# Patient Record
Sex: FEMALE | Race: WHITE | NOT HISPANIC OR LATINO | Employment: OTHER | URBAN - METROPOLITAN AREA
[De-identification: names, ages, dates, MRNs, and addresses within clinical notes are randomized per-mention and may not be internally consistent; named-entity substitution may affect disease eponyms.]

---

## 2022-03-15 ENCOUNTER — APPOINTMENT (OUTPATIENT)
Dept: LAB | Facility: CLINIC | Age: 77
End: 2022-03-15
Payer: MEDICARE

## 2022-03-15 DIAGNOSIS — E11.29 TYPE 2 DIABETES MELLITUS WITH OTHER KIDNEY COMPLICATION, UNSPECIFIED WHETHER LONG TERM INSULIN USE (HCC): ICD-10-CM

## 2022-03-15 DIAGNOSIS — R80.9 PROTEINURIA, UNSPECIFIED TYPE: ICD-10-CM

## 2022-03-15 LAB
ALBUMIN SERPL BCP-MCNC: 3.9 G/DL (ref 3.5–5)
ALP SERPL-CCNC: 44 U/L (ref 46–116)
ALT SERPL W P-5'-P-CCNC: 57 U/L (ref 12–78)
ANION GAP SERPL CALCULATED.3IONS-SCNC: 3 MMOL/L (ref 4–13)
AST SERPL W P-5'-P-CCNC: 32 U/L (ref 5–45)
BILIRUB SERPL-MCNC: 0.37 MG/DL (ref 0.2–1)
BUN SERPL-MCNC: 19 MG/DL (ref 5–25)
CALCIUM SERPL-MCNC: 9.6 MG/DL (ref 8.3–10.1)
CHLORIDE SERPL-SCNC: 107 MMOL/L (ref 100–108)
CHOLEST SERPL-MCNC: 137 MG/DL
CO2 SERPL-SCNC: 31 MMOL/L (ref 21–32)
CREAT SERPL-MCNC: 0.89 MG/DL (ref 0.6–1.3)
ERYTHROCYTE [DISTWIDTH] IN BLOOD BY AUTOMATED COUNT: 13.9 % (ref 11.6–15.1)
EST. AVERAGE GLUCOSE BLD GHB EST-MCNC: 143 MG/DL
GFR SERPL CREATININE-BSD FRML MDRD: 64 ML/MIN/1.73SQ M
GLUCOSE P FAST SERPL-MCNC: 88 MG/DL (ref 65–99)
HBA1C MFR BLD: 6.6 %
HCT VFR BLD AUTO: 42.5 % (ref 34.8–46.1)
HDLC SERPL-MCNC: 45 MG/DL
HGB BLD-MCNC: 13.7 G/DL (ref 11.5–15.4)
LDLC SERPL CALC-MCNC: 68 MG/DL (ref 0–100)
MCH RBC QN AUTO: 30.4 PG (ref 26.8–34.3)
MCHC RBC AUTO-ENTMCNC: 32.2 G/DL (ref 31.4–37.4)
MCV RBC AUTO: 94 FL (ref 82–98)
NONHDLC SERPL-MCNC: 92 MG/DL
PLATELET # BLD AUTO: 222 THOUSANDS/UL (ref 149–390)
PMV BLD AUTO: 11 FL (ref 8.9–12.7)
POTASSIUM SERPL-SCNC: 4.3 MMOL/L (ref 3.5–5.3)
PROT SERPL-MCNC: 7.2 G/DL (ref 6.4–8.2)
RBC # BLD AUTO: 4.51 MILLION/UL (ref 3.81–5.12)
SODIUM SERPL-SCNC: 141 MMOL/L (ref 136–145)
TRIGL SERPL-MCNC: 119 MG/DL
WBC # BLD AUTO: 9.57 THOUSAND/UL (ref 4.31–10.16)

## 2022-03-15 PROCEDURE — 80053 COMPREHEN METABOLIC PANEL: CPT

## 2022-03-15 PROCEDURE — 36415 COLL VENOUS BLD VENIPUNCTURE: CPT

## 2022-03-15 PROCEDURE — 80061 LIPID PANEL: CPT

## 2022-03-15 PROCEDURE — 85027 COMPLETE CBC AUTOMATED: CPT

## 2022-03-15 PROCEDURE — 83036 HEMOGLOBIN GLYCOSYLATED A1C: CPT

## 2022-04-20 ENCOUNTER — OFFICE VISIT (OUTPATIENT)
Dept: OBGYN CLINIC | Facility: CLINIC | Age: 77
End: 2022-04-20
Payer: MEDICARE

## 2022-04-20 VITALS
SYSTOLIC BLOOD PRESSURE: 110 MMHG | BODY MASS INDEX: 26.29 KG/M2 | WEIGHT: 154 LBS | DIASTOLIC BLOOD PRESSURE: 80 MMHG | HEIGHT: 64 IN

## 2022-04-20 DIAGNOSIS — Z12.31 VISIT FOR SCREENING MAMMOGRAM: ICD-10-CM

## 2022-04-20 DIAGNOSIS — N81.10 VAGINAL PROLAPSE: ICD-10-CM

## 2022-04-20 DIAGNOSIS — Z01.419 ENCOUNTER FOR ANNUAL ROUTINE GYNECOLOGICAL EXAMINATION: Primary | ICD-10-CM

## 2022-04-20 PROCEDURE — G0101 CA SCREEN;PELVIC/BREAST EXAM: HCPCS | Performed by: STUDENT IN AN ORGANIZED HEALTH CARE EDUCATION/TRAINING PROGRAM

## 2022-04-20 RX ORDER — CIPROFLOXACIN 250 MG/1
TABLET, FILM COATED ORAL
COMMUNITY
Start: 2022-03-18

## 2022-04-20 RX ORDER — A/SINGAPORE/GP1908/2015 IVR-180 (AN A/MICHIGAN/45/2015 (H1N1)PDM09-LIKE VIRUS, A/HONG KONG/4801/2014, NYMC X-263B (H3N2) (AN A/HONG KONG/4801/2014-LIKE VIRUS), AND B/BRISBANE/60/2008, WILD TYPE (A B/BRISBANE/60/2008-LIKE VIRUS) 15; 15; 15 UG/.5ML; UG/.5ML; UG/.5ML
INJECTION, SUSPENSION INTRAMUSCULAR
COMMUNITY

## 2022-04-20 RX ORDER — FLUOCINOLONE ACETONIDE 0.1 MG/ML
SOLUTION TOPICAL
COMMUNITY

## 2022-04-20 NOTE — PROGRESS NOTES
Assessment/Plan:    79 yo F -      Problem List Items Addressed This Visit    Visit Diagnoses     Encounter for annual routine gynecological examination    -  Primary  Pap not indicated  Will send records    Visit for screening mammogram        Relevant Orders    Mammo screening bilateral w 3d & cad    Vaginal prolapse  Continue pessary for now - f/u in 3 months  Considering surgery - will see urogyn  Relevant Orders    Ambulatory referral to Urogynecology            Subjective:      Patient ID: Mary Navarro is a 78 y o  female  This is a 78 y o  postmenopausal G0 who presents for annual exam  She uses a pessary for prolapse  She reports it was last exchanged 1 year ago by her GYN in the [place]  It significantly helped her prolapse symptoms  She recently had copious foul smelling discharge  She was treated with flagyl for suspected BV and it improved  She denies vaginal bleeding or pelvic pain  She does endorse both stress and urge incontinence  She is interested in surgical treatment of both prolapse and incontinence  Last pap smear: per pt no h/o abnormal  Last mammogram: per pt Oct 2021 normal  Colon Cancer screening: per pt up to date  DEXA: was scheduled for tomorrow but machine is broken - will reschedule      Family history:  Breast cancer: maternal aunt     had MI and open heart surgery during covid pandemic  The following portions of the patient's history were reviewed and updated as appropriate: allergies, current medications, past family history, past medical history, past social history, past surgical history and problem list     Review of Systems   Constitutional: Negative  HENT: Negative  Eyes: Negative  Respiratory: Negative  Cardiovascular: Negative  Gastrointestinal: Negative  Endocrine: Negative  Genitourinary: Negative for dyspareunia, dysuria, frequency, menstrual problem, pelvic pain, vaginal discharge and vaginal pain  Musculoskeletal: Negative  Skin: Negative  Allergic/Immunologic: Negative  Neurological: Negative  Hematological: Negative  Psychiatric/Behavioral: Negative  Objective:      /80   Ht 5' 4" (1 626 m)   Wt 69 9 kg (154 lb)   LMP  (Exact Date)   BMI 26 43 kg/m²          Physical Exam  Vitals reviewed  Exam conducted with a chaperone present  Cardiovascular:      Rate and Rhythm: Normal rate  Pulmonary:      Effort: Pulmonary effort is normal    Chest:   Breasts: Breasts are symmetrical       Right: No mass, nipple discharge, skin change or tenderness  Left: No mass, nipple discharge, skin change or tenderness  Abdominal:      Palpations: Abdomen is soft  Genitourinary:     Labia:         Right: No rash  Left: No rash  Vagina: Normal  No signs of injury  Cervix: No cervical motion tenderness, discharge, friability or lesion  Uterus: Not enlarged and not fixed  Adnexa:         Right: No mass, tenderness or fullness  Left: No mass, tenderness or fullness  Comments: Pessary removed and replaced  No granulation tissue  +cystocele and uterine prolapse  +atrophy  Musculoskeletal:      Cervical back: Normal range of motion  Skin:     General: Skin is warm and dry  Neurological:      Mental Status: She is alert and oriented to person, place, and time     Psychiatric:         Behavior: Behavior normal

## 2022-04-22 ENCOUNTER — IMMUNIZATIONS (OUTPATIENT)
Dept: FAMILY MEDICINE CLINIC | Facility: HOSPITAL | Age: 77
End: 2022-04-22

## 2022-04-22 PROCEDURE — 91305 COVID-19 PFIZER VACC TRIS-SUCROSE GRAY CAP 0.3 ML: CPT

## 2022-04-22 PROCEDURE — 0054A COVID-19 PFIZER VACC TRIS-SUCROSE GRAY CAP 0.3 ML: CPT

## 2022-09-19 ENCOUNTER — OFFICE VISIT (OUTPATIENT)
Dept: OBGYN CLINIC | Facility: CLINIC | Age: 77
End: 2022-09-19
Payer: MEDICARE

## 2022-09-19 VITALS
SYSTOLIC BLOOD PRESSURE: 120 MMHG | DIASTOLIC BLOOD PRESSURE: 74 MMHG | BODY MASS INDEX: 26.77 KG/M2 | HEIGHT: 64 IN | WEIGHT: 156.8 LBS

## 2022-09-19 DIAGNOSIS — E11.29 TYPE 2 DIABETES MELLITUS WITH MICROALBUMINURIA, WITHOUT LONG-TERM CURRENT USE OF INSULIN (HCC): ICD-10-CM

## 2022-09-19 DIAGNOSIS — R32 URINARY INCONTINENCE, UNSPECIFIED TYPE: ICD-10-CM

## 2022-09-19 DIAGNOSIS — N95.8 GENITOURINARY SYNDROME OF MENOPAUSE: ICD-10-CM

## 2022-09-19 DIAGNOSIS — B96.89 BV (BACTERIAL VAGINOSIS): ICD-10-CM

## 2022-09-19 DIAGNOSIS — R39.9 UTI SYMPTOMS: ICD-10-CM

## 2022-09-19 DIAGNOSIS — Z46.89 PESSARY MAINTENANCE: Primary | ICD-10-CM

## 2022-09-19 DIAGNOSIS — R80.9 TYPE 2 DIABETES MELLITUS WITH MICROALBUMINURIA, WITHOUT LONG-TERM CURRENT USE OF INSULIN (HCC): ICD-10-CM

## 2022-09-19 DIAGNOSIS — N76.0 BV (BACTERIAL VAGINOSIS): ICD-10-CM

## 2022-09-19 PROCEDURE — 87086 URINE CULTURE/COLONY COUNT: CPT | Performed by: STUDENT IN AN ORGANIZED HEALTH CARE EDUCATION/TRAINING PROGRAM

## 2022-09-19 PROCEDURE — 81001 URINALYSIS AUTO W/SCOPE: CPT | Performed by: STUDENT IN AN ORGANIZED HEALTH CARE EDUCATION/TRAINING PROGRAM

## 2022-09-19 PROCEDURE — 87186 SC STD MICRODIL/AGAR DIL: CPT | Performed by: STUDENT IN AN ORGANIZED HEALTH CARE EDUCATION/TRAINING PROGRAM

## 2022-09-19 PROCEDURE — 99213 OFFICE O/P EST LOW 20 MIN: CPT | Performed by: STUDENT IN AN ORGANIZED HEALTH CARE EDUCATION/TRAINING PROGRAM

## 2022-09-19 PROCEDURE — 87077 CULTURE AEROBIC IDENTIFY: CPT | Performed by: STUDENT IN AN ORGANIZED HEALTH CARE EDUCATION/TRAINING PROGRAM

## 2022-09-19 RX ORDER — SULFAMETHOXAZOLE AND TRIMETHOPRIM 800; 160 MG/1; MG/1
1 TABLET ORAL EVERY 12 HOURS SCHEDULED
Qty: 6 TABLET | Refills: 0 | Status: SHIPPED | OUTPATIENT
Start: 2022-09-19 | End: 2022-09-22

## 2022-09-19 RX ORDER — ESTRADIOL 0.1 MG/G
CREAM VAGINAL
Qty: 42.5 G | Refills: 1 | Status: SHIPPED | OUTPATIENT
Start: 2022-09-19

## 2022-09-19 RX ORDER — METRONIDAZOLE 500 MG/1
500 TABLET ORAL EVERY 12 HOURS SCHEDULED
Qty: 14 TABLET | Refills: 0 | Status: SHIPPED | OUTPATIENT
Start: 2022-09-19 | End: 2022-09-26

## 2022-09-19 RX ORDER — PRAVASTATIN SODIUM 40 MG
TABLET ORAL
COMMUNITY
Start: 2022-09-08

## 2022-09-19 RX ORDER — LISINOPRIL AND HYDROCHLOROTHIAZIDE 20; 12.5 MG/1; MG/1
1 TABLET ORAL DAILY
COMMUNITY
Start: 2022-09-08

## 2022-09-19 NOTE — PROGRESS NOTES
Assessment/Plan:      Diagnoses and all orders for this visit:    Pessary maintenance  Cleaned and replaced today w/o issue  Will start estrace after flagyl course complete  Thinks she will want surgery at some point  Has referral   F/u in 3 months or prn  UTI symptoms  -     Urine culture  -     Urinalysis with microscopic  -     sulfamethoxazole-trimethoprim (BACTRIM DS) 800-160 mg per tablet; Take 1 tablet by mouth every 12 (twelve) hours for 3 days    Urinary incontinence, unspecified type  -     Ambulatory Referral to Physical Therapy; Future    Type 2 diabetes mellitus with microalbuminuria, without long-term current use of insulin ([facility])    BV (bacterial vaginosis)  -     metroNIDAZOLE (FLAGYL) 500 mg tablet; Take 1 tablet (500 mg total) by mouth every 12 (twelve) hours for 7 days    Genitourinary syndrome of menopause  -     estradiol (ESTRACE VAGINAL) 0 1 mg/g vaginal cream; Insert 1 g into vagina 2 nights per week  Other orders  -     lisinopril-hydrochlorothiazide (PRINZIDE,ZESTORETIC) 20-12 5 MG per tablet; Take 1 tablet by mouth daily  -     metFORMIN (GLUCOPHAGE) 500 mg tablet; Take 500 mg by mouth 2 (two) times a day with meals  -     pravastatin (PRAVACHOL) 40 mg tablet; TAKE 1 TABLET(40 MG) BY MOUTH EVERY NIGHT          Subjective:     Patient ID: Madi Nolasco is a 78 y o  female  Patient is here for a pessary check  She uses a ring pessary for prolapse and she is happy with it  She does reports lots of discharge  Denies bleeding  Occasional stinging feeling but no pain  She believes she has another urinary infection  She has cloudy urine and it burns when urinating  She is getting up 3 times a night to go to the bathroom and sometimes leaks  Review of Systems   All other systems reviewed and are negative  Objective:     Physical Exam  Vitals reviewed     Pulmonary:      Effort: Pulmonary effort is normal    Genitourinary:     Comments: + large amount of foul smelling discharge  Small break in skin right at introitus  Otherwise no ulceration or granulation in vagina  Neurological:      Mental Status: She is alert and oriented to person, place, and time     Psychiatric:         Behavior: Behavior normal

## 2022-09-20 LAB
BACTERIA UR QL AUTO: ABNORMAL /HPF
BILIRUB UR QL STRIP: NEGATIVE
CLARITY UR: CLEAR
COLOR UR: YELLOW
GLUCOSE UR STRIP-MCNC: ABNORMAL MG/DL
HGB UR QL STRIP.AUTO: ABNORMAL
HYALINE CASTS #/AREA URNS LPF: ABNORMAL /LPF
KETONES UR STRIP-MCNC: NEGATIVE MG/DL
LEUKOCYTE ESTERASE UR QL STRIP: ABNORMAL
MUCOUS THREADS UR QL AUTO: ABNORMAL
NITRITE UR QL STRIP: POSITIVE
NON-SQ EPI CELLS URNS QL MICRO: ABNORMAL /HPF
PH UR STRIP.AUTO: 5.5 [PH]
PROT UR STRIP-MCNC: ABNORMAL MG/DL
RBC #/AREA URNS AUTO: ABNORMAL /HPF
SP GR UR STRIP.AUTO: 1.02 (ref 1–1.03)
UROBILINOGEN UR STRIP-ACNC: <2 MG/DL
WBC #/AREA URNS AUTO: ABNORMAL /HPF

## 2022-09-21 LAB — BACTERIA UR CULT: ABNORMAL

## 2022-09-22 ENCOUNTER — TELEPHONE (OUTPATIENT)
Dept: OBGYN CLINIC | Facility: CLINIC | Age: 77
End: 2022-09-22

## 2022-09-22 NOTE — TELEPHONE ENCOUNTER
Spoke to pt and reviewed results and recommendations from their Urine per  CS  +UTI- finish abx on day 3 now- will use cream after  She does not drink enough because she does not want to stay up all night going to the bathroom

## 2022-09-22 NOTE — TELEPHONE ENCOUNTER
----- Message from Vernon Monaco MD sent at 9/22/2022 10:26 AM EDT -----  +UTI - abx rx'd at her visit should have treated it  Can you please see how she is feeling?

## 2022-09-23 ENCOUNTER — HOSPITAL ENCOUNTER (OUTPATIENT)
Dept: BONE DENSITY | Facility: CLINIC | Age: 77
Discharge: HOME | End: 2022-09-23
Payer: MEDICARE

## 2022-09-23 DIAGNOSIS — Z78.0 POSTMENOPAUSAL: ICD-10-CM

## 2022-09-23 PROCEDURE — 77080 DXA BONE DENSITY AXIAL: CPT

## 2022-10-20 ENCOUNTER — HOSPITAL ENCOUNTER (OUTPATIENT)
Dept: MAMMOGRAPHY | Facility: CLINIC | Age: 77
Discharge: HOME | End: 2022-10-20
Payer: MEDICARE

## 2022-10-20 VITALS — WEIGHT: 156 LBS | BODY MASS INDEX: 26.63 KG/M2 | HEIGHT: 64 IN

## 2022-10-20 DIAGNOSIS — Z12.31 VISIT FOR SCREENING MAMMOGRAM: ICD-10-CM

## 2022-10-20 PROCEDURE — 77067 SCR MAMMO BI INCL CAD: CPT

## 2022-10-20 PROCEDURE — 77063 BREAST TOMOSYNTHESIS BI: CPT

## 2022-11-08 ENCOUNTER — TELEPHONE (OUTPATIENT)
Dept: OBGYN CLINIC | Facility: CLINIC | Age: 77
End: 2022-11-08

## 2022-11-08 NOTE — TELEPHONE ENCOUNTER
----- Message from Leidy Del Valle MD sent at 11/8/2022 12:20 PM EST -----  Please notify f/u US and mammo have been ordered and she can schedule at anytime

## 2022-11-08 NOTE — TELEPHONE ENCOUNTER
Per comm consent lm to pt with results and recommendations of mammo from CS   Call scheduling to get additional imaging set up

## 2022-12-12 ENCOUNTER — APPOINTMENT (OUTPATIENT)
Dept: LAB | Facility: CLINIC | Age: 77
End: 2022-12-12

## 2022-12-12 DIAGNOSIS — E11.29 TYPE II DIABETES MELLITUS WITH RENAL MANIFESTATIONS NOT AT GOAL (HCC): ICD-10-CM

## 2022-12-12 DIAGNOSIS — I10 HYPERTENSION WITH ALBUMINURIA: ICD-10-CM

## 2022-12-12 DIAGNOSIS — R80.9 HYPERTENSION WITH ALBUMINURIA: ICD-10-CM

## 2022-12-12 LAB
ALBUMIN SERPL BCP-MCNC: 3.9 G/DL (ref 3.5–5)
ALP SERPL-CCNC: 44 U/L (ref 46–116)
ALT SERPL W P-5'-P-CCNC: 52 U/L (ref 12–78)
ANION GAP SERPL CALCULATED.3IONS-SCNC: 2 MMOL/L (ref 4–13)
AST SERPL W P-5'-P-CCNC: 27 U/L (ref 5–45)
BILIRUB SERPL-MCNC: 0.53 MG/DL (ref 0.2–1)
BUN SERPL-MCNC: 22 MG/DL (ref 5–25)
CALCIUM SERPL-MCNC: 9.4 MG/DL (ref 8.3–10.1)
CHLORIDE SERPL-SCNC: 107 MMOL/L (ref 96–108)
CHOLEST SERPL-MCNC: 136 MG/DL
CO2 SERPL-SCNC: 30 MMOL/L (ref 21–32)
CREAT SERPL-MCNC: 0.79 MG/DL (ref 0.6–1.3)
ERYTHROCYTE [DISTWIDTH] IN BLOOD BY AUTOMATED COUNT: 14 % (ref 11.6–15.1)
GFR SERPL CREATININE-BSD FRML MDRD: 73 ML/MIN/1.73SQ M
GLUCOSE P FAST SERPL-MCNC: 134 MG/DL (ref 65–99)
HCT VFR BLD AUTO: 43.7 % (ref 34.8–46.1)
HDLC SERPL-MCNC: 50 MG/DL
HGB BLD-MCNC: 13.5 G/DL (ref 11.5–15.4)
LDLC SERPL CALC-MCNC: 69 MG/DL (ref 0–100)
MCH RBC QN AUTO: 30.6 PG (ref 26.8–34.3)
MCHC RBC AUTO-ENTMCNC: 30.9 G/DL (ref 31.4–37.4)
MCV RBC AUTO: 99 FL (ref 82–98)
NONHDLC SERPL-MCNC: 86 MG/DL
PLATELET # BLD AUTO: 194 THOUSANDS/UL (ref 149–390)
PMV BLD AUTO: 12.1 FL (ref 8.9–12.7)
POTASSIUM SERPL-SCNC: 4 MMOL/L (ref 3.5–5.3)
PROT SERPL-MCNC: 7.2 G/DL (ref 6.4–8.4)
RBC # BLD AUTO: 4.41 MILLION/UL (ref 3.81–5.12)
SODIUM SERPL-SCNC: 139 MMOL/L (ref 135–147)
TRIGL SERPL-MCNC: 87 MG/DL
TSH SERPL DL<=0.05 MIU/L-ACNC: 1.48 UIU/ML (ref 0.45–4.5)
WBC # BLD AUTO: 7.5 THOUSAND/UL (ref 4.31–10.16)

## 2022-12-13 ENCOUNTER — TELEPHONE (OUTPATIENT)
Dept: OBGYN CLINIC | Facility: CLINIC | Age: 77
End: 2022-12-13

## 2022-12-13 LAB
EST. AVERAGE GLUCOSE BLD GHB EST-MCNC: 134 MG/DL
HBA1C MFR BLD: 6.3 %

## 2022-12-13 NOTE — TELEPHONE ENCOUNTER
Spoke to pt and she said she got records from [place] and took to breast center and they said the imaging was not needed- can go next yr      The records are in media

## 2022-12-13 NOTE — TELEPHONE ENCOUNTER
----- Message from Andrzej Fierro MD sent at 12/13/2022 12:30 PM EST -----  This pt needs f/u breast imaging  Looks like they were scheduled but then cancelled by provider  Can you please help her to reschedule?

## 2023-03-16 DIAGNOSIS — N95.8 GENITOURINARY SYNDROME OF MENOPAUSE: ICD-10-CM

## 2023-03-16 RX ORDER — ESTRADIOL 0.1 MG/G
CREAM VAGINAL
Qty: 42.5 G | Refills: 1 | Status: SHIPPED | OUTPATIENT
Start: 2023-03-16

## 2023-03-30 ENCOUNTER — OFFICE VISIT (OUTPATIENT)
Dept: OBGYN CLINIC | Facility: CLINIC | Age: 78
End: 2023-03-30

## 2023-03-30 VITALS
WEIGHT: 159.2 LBS | SYSTOLIC BLOOD PRESSURE: 120 MMHG | BODY MASS INDEX: 27.18 KG/M2 | DIASTOLIC BLOOD PRESSURE: 70 MMHG | HEIGHT: 64 IN

## 2023-03-30 DIAGNOSIS — Z12.11 SCREENING FOR COLON CANCER: ICD-10-CM

## 2023-03-30 DIAGNOSIS — Z46.89 PESSARY MAINTENANCE: Primary | ICD-10-CM

## 2023-03-30 PROBLEM — K59.04 CHRONIC IDIOPATHIC CONSTIPATION: Status: ACTIVE | Noted: 2018-03-12

## 2023-03-30 PROBLEM — M19.011 ARTHRITIS OF RIGHT ACROMIOCLAVICULAR JOINT: Status: ACTIVE | Noted: 2022-03-18

## 2023-03-30 PROBLEM — Z96.0 VAGINAL PESSARY IN SITU: Status: ACTIVE | Noted: 2021-06-22

## 2023-03-30 PROBLEM — I10 BENIGN ESSENTIAL HYPERTENSION: Status: ACTIVE | Noted: 2022-03-15

## 2023-03-30 PROBLEM — K76.0 FATTY LIVER: Status: ACTIVE | Noted: 2018-01-24

## 2023-03-30 PROBLEM — L57.0 ACTINIC KERATOSIS: Status: ACTIVE | Noted: 2022-12-11

## 2023-03-30 PROBLEM — L71.9 ROSACEA: Status: ACTIVE | Noted: 2022-12-11

## 2023-03-30 NOTE — PROGRESS NOTES
Diagnoses and all orders for this visit:    Pessary maintenance    Screening for colon cancer  -     Ambulatory Referral to Gastroenterology; Future    Call if no symptom improvement, all questions answered, return for annual         Pleasant 78 y o  here for pessary maintenance  She has a pessary for prolapse  She is no longer interested in surgical options  She states she is doing well since starting vaginal Estrace and her discharge resolved  She never took the metronidazole prescribed in 2022  I did discuss that she should have pessary cleanings every 3 months  She denies fever or pelvic pain  She denies any sexual activity anymore although she states her  would like to  Her last annual exam was 2022  She requests a Colonoscopy referral, has never had one  Mammogram 10/20/2022     History reviewed  No pertinent past medical history  Past Surgical History:   Procedure Laterality Date   • BREAST CYST EXCISION Right      Social History     Tobacco Use   • Smoking status: Never   • Smokeless tobacco: Never   Vaping Use   • Vaping Use: Never used   Substance Use Topics   • Alcohol use:  Yes   • Drug use: Never     Family History   Problem Relation Age of Onset   • Breast cancer Maternal Aunt    • Colon cancer Neg Hx    • Ovarian cancer Neg Hx        Current Outpatient Medications:   •  estradiol (ESTRACE) 0 1 mg/g vaginal cream, INSERT 1 GRAM VAGINALLY 2 NIGHTS PER WEEK, Disp: 42 5 g, Rfl: 1  •  lisinopril-hydrochlorothiazide (PRINZIDE,ZESTORETIC) 20-12 5 MG per tablet, Take 1 tablet by mouth daily, Disp: , Rfl:   •  metFORMIN (GLUCOPHAGE) 500 mg tablet, Take 500 mg by mouth 2 (two) times a day with meals, Disp: , Rfl:   •  pravastatin (PRAVACHOL) 40 mg tablet, TAKE 1 TABLET(40 MG) BY MOUTH EVERY NIGHT, Disp: , Rfl:     No Known Allergies  OB History    Para Term  AB Living   1 0 0   1     SAB IAB Ectopic Multiple Live Births                  # Outcome Date GA Lbr "Tadeo/2nd Weight Sex Delivery Anes PTL Lv   1 AB                Vitals:    03/30/23 1442   BP: 120/70   BP Location: Right arm   Patient Position: Sitting   Weight: 72 2 kg (159 lb 3 2 oz)   Height: 5' 4\" (1 626 m)     Body mass index is 27 33 kg/m²  No LMP recorded  Patient is postmenopausal     Review of Systems   Constitutional: Negative for chills, fatigue, fever and unexpected weight change  Respiratory: Negative for shortness of breath  Gastrointestinal: Negative for anal bleeding, blood in stool, constipation and diarrhea  Genitourinary: Negative for difficulty urinating, dysuria and hematuria  Physical Exam   Constitutional: She appears well-developed and well-nourished  No distress  Alert and oriented  HENT: atraumatic  Head: Normocephalic  Neck: Normal range of motion  Neck supple  Pulmonary: Effort normal   Abdominal: Soft  Pelvic exam was performed with patient supine  No labial fusion  There is no rash, tenderness, lesion or injury on the right labia  There is no rash, tenderness, lesion or injury on the left labia  Urethral meatus does not show any tenderness, inflammation or discharge  Palpation of midline bladder without pain or discomfort  Uterus is not deviated, not enlarged, not fixed and not tender  Cervix exhibits no motion tenderness, no discharge and no friability  Right adnexum displays no mass, no tenderness and no fullness  Left adnexum displays no mass, no tenderness and no fullness  No erythema or tenderness in the vagina  No foreign body in the vagina  No signs of injury around the vagina  Vaginal discharge found  Perineum and anus without areas of injury  No lesions noted or swelling  Pessary removed and replaced  No granulation tissue  +cystocele and uterine prolapse  +atrophy  Lymphadenopathy:        Right: No inguinal adenopathy present  Left: No inguinal adenopathy present               "

## 2023-03-30 NOTE — PATIENT INSTRUCTIONS
Pessary   AMBULATORY CARE:   What you need to know about a pessary:  A pessary is a small silicone device that fits in your vagina  A pessary helps support your bladder, uterus, vagina, or rectum  A pessary is used for urinary incontinence, or sagging of your organs into your vagina  What will happen during pessary insertion:   There are many different kinds of pessaries  Your healthcare provider will work with you to find one that fits properly and is comfortable  Your healthcare provider will insert the pessary  He or she will teach you how to insert the pessary correctly  He or she will also teach you how to remove the pessary for cleaning  What will happen after pessary insertion:  You may need to return in 1 to 2 days to have the pessary checked  You may have more vaginal discharge than is normal for you  Risks of a pessary: Your discharge may develop an odor  You may have bleeding from your vagina  Your vagina may become irritated from the pessary  You may develop sores in your vagina  Call your doctor if:   You are bleeding from your vagina  You have trouble urinating or having a bowel movement  You have discomfort or pain with the pessary in place  You have burning, itching, or irritation in your vagina from the pessary  Your pessary keeps falling out  This may be a sign that it is too small  You have questions or concerns about your condition or care  Care for yourself at home:   Clean your pessary  with soap and water  You can do this every few days, or as directed by your healthcare provider  You may be able to wear the pessary during sexual intercourse  Ask your healthcare provider if sexual intercourse is okay with your pessary in place  If your vaginal discharge has an odor,  use vaginal gel or clean your pessary more often  The pessary can fall out  if you strain too hard or lift heavy objects   However, the pessary cannot move anywhere else inside your body     Do pelvic muscle exercises  These are also called Kegel exercises  These exercises help strengthen your pelvic muscles and help prevent urine leakage  Tighten the muscles of your pelvis and hold them tight for 5 seconds  Then relax for 5 seconds  Gradually work up to tightening them for 10 seconds and relaxing for 10 seconds  Do this 3 times each day  Follow up with your doctor as directed: You may need to return in 1 to 2 days to check the placement of the pessary  The size or shape may need to be changed to be comfortable  After that you may need to return every few weeks or months for a checkup  Write down your questions so you remember to ask them during your visits  © Copyright [employer] 2022 Information is for End User's use only and may not be sold, redistributed or otherwise used for commercial purposes  The above information is an  only  It is not intended as medical advice for individual conditions or treatments  Talk to your doctor, nurse or pharmacist before following any medical regimen to see if it is safe and effective for you

## 2023-05-23 ENCOUNTER — OFFICE VISIT (OUTPATIENT)
Dept: GASTROENTEROLOGY | Facility: CLINIC | Age: 78
End: 2023-05-23

## 2023-05-23 VITALS
HEART RATE: 85 BPM | SYSTOLIC BLOOD PRESSURE: 118 MMHG | WEIGHT: 157.4 LBS | DIASTOLIC BLOOD PRESSURE: 66 MMHG | BODY MASS INDEX: 26.87 KG/M2 | HEIGHT: 64 IN | OXYGEN SATURATION: 98 %

## 2023-05-23 DIAGNOSIS — K59.04 CHRONIC IDIOPATHIC CONSTIPATION: Primary | ICD-10-CM

## 2023-05-23 RX ORDER — CLINDAMYCIN PHOSPHATE AND BENZOYL PEROXIDE 10; 50 MG/G; MG/G
GEL TOPICAL
COMMUNITY
Start: 2023-04-12

## 2023-05-23 NOTE — PROGRESS NOTES
Consultation - [facility]  Poonam Pacheco [id number] 78 y o  female     ASSESSMENT @ PLAN:   She is a 82-year-old female with a colonoscopy with polyps removed in September 2021 at [facility] in [facility] who is due for 3-year recall  She has occasional abdominal pain and constipation that responds to MiraLAX    1 she will continue to use MiraLAX intermittently    2 I will put her in for recall for September 2024    3 she specifically gave me permission to go into care everywhere to look at her records    Chief Complaint: Abdominal pain and constipation and history of colon polyps    HPI: She is a 82-year-old female here to schedule colonoscopy when she is due  She moved here from [facility]  She had a colonoscopy in September 2021 which I was able to look at in care everywhere  She had diverticulosis and polyps removed  Her doctor put her in for 3-year recall  This will be in 2024  She has no melena or hematochezia or weight loss  Nobody in the family had colon cancer  She has occasional abdominal pain and constipation that does respond to MiraLAX  She is active and otherwise healthy  REVIEW OF SYSTEMS:     CONSTITUTIONAL: Denies any fever, chills, or rigors  Good appetite, and no recent weight loss  HEENT: No earache or tinnitus  Denies hearing loss or visual disturbances  CARDIOVASCULAR: No chest pain or palpitations  RESPIRATORY: Denies any cough, hemoptysis, shortness of breath or dyspnea on exertion  GASTROINTESTINAL: As noted in the History of Present Illness  GENITOURINARY: No problems with urination  Denies any hematuria or dysuria  NEUROLOGIC: No dizziness or vertigo, denies headaches  MUSCULOSKELETAL: Denies any muscle or joint pain  SKIN: Denies skin rashes or itching  ENDOCRINE: Denies excessive thirst  Denies intolerance to heat or cold  PSYCHOSOCIAL: Denies depression or anxiety  Denies any recent memory loss  History reviewed   No "pertinent past medical history  Past Surgical History:   Procedure Laterality Date   • BREAST CYST EXCISION Right 2017   • CYST REMOVAL Left 02/2023    arm     Social History     Socioeconomic History   • Marital status: /Civil Union     Spouse name: Not on file   • Number of children: Not on file   • Years of education: Not on file   • Highest education level: Not on file   Occupational History   • Not on file   Tobacco Use   • Smoking status: Never   • Smokeless tobacco: Never   Vaping Use   • Vaping Use: Never used   Substance and Sexual Activity   • Alcohol use: Yes   • Drug use: Never   • Sexual activity: Not Currently   Other Topics Concern   • Not on file   Social History Narrative   • Not on file     Social Determinants of Health     Financial Resource Strain: Not on file   Food Insecurity: Not on file   Transportation Needs: Not on file   Physical Activity: Not on file   Stress: Not on file   Social Connections: Not on file   Intimate Partner Violence: Not on file   Housing Stability: Not on file     Family History   Problem Relation Age of Onset   • Breast cancer Maternal Aunt    • Colon cancer Neg Hx    • Ovarian cancer Neg Hx      Patient has no known allergies  Current Outpatient Medications   Medication Sig Dispense Refill   • Clindamycin Phos-Benzoyl Perox gel APPLY TOPICALLY TO THE AFFECTED AREA EVERY DAY     • estradiol (ESTRACE) 0 1 mg/g vaginal cream INSERT 1 GRAM VAGINALLY 2 NIGHTS PER WEEK 42 5 g 1   • lisinopril-hydrochlorothiazide (PRINZIDE,ZESTORETIC) 20-12 5 MG per tablet Take 1 tablet by mouth daily     • metFORMIN (GLUCOPHAGE) 500 mg tablet Take 500 mg by mouth 2 (two) times a day with meals     • pravastatin (PRAVACHOL) 40 mg tablet TAKE 1 TABLET(40 MG) BY MOUTH EVERY NIGHT       No current facility-administered medications for this visit  Blood pressure 118/66, pulse 85, height 5' 4\" (1 626 m), weight 71 4 kg (157 lb 6 4 oz), SpO2 98 %      PHYSICAL EXAM:     General " Appearance:   Alert, cooperative, no distress, appears stated age    HEENT:   Normocephalic, atraumatic, anicteric      Neck:  Supple, symmetrical, trachea midline, no adenopathy;    thyroid: no enlargement/tenderness/nodules; no carotid  bruit or JVD    Lungs:   Clear to auscultation bilaterally; no rales, rhonchi or wheezing; respirations unlabored    Heart[de-identified]   S1 and S2 normal; regular rate and rhythm; no murmur, rub, or gallop     Abdomen:   Soft, non-tender, non-distended; normal bowel sounds; no masses, no organomegaly    Genitalia:   Deferred    Rectal:   Deferred    Extremities:  No cyanosis, clubbing or edema    Pulses:  2+ and symmetric all extremities    Skin:  Skin color, texture, turgor normal, no rashes or lesions    Lymph nodes:  No palpable cervical, axillary or inguinal lymphadenopathy        Lab Results   Component Value Date    WBC 7 50 12/12/2022    HGB 13 5 12/12/2022    HCT 43 7 12/12/2022    MCV 99 (H) 12/12/2022     12/12/2022     Lab Results   Component Value Date    CALCIUM 9 4 12/12/2022    K 4 0 12/12/2022    CO2 30 12/12/2022     12/12/2022    BUN 22 12/12/2022    CREATININE 0 79 12/12/2022     Lab Results   Component Value Date    ALT 52 12/12/2022    AST 27 12/12/2022    ALKPHOS 44 (L) 12/12/2022     No results found for: INR, PROTIME

## 2023-07-05 ENCOUNTER — OFFICE VISIT (OUTPATIENT)
Dept: OBGYN CLINIC | Facility: CLINIC | Age: 78
End: 2023-07-05
Payer: MEDICARE

## 2023-07-05 VITALS
WEIGHT: 155 LBS | HEIGHT: 64 IN | BODY MASS INDEX: 26.46 KG/M2 | SYSTOLIC BLOOD PRESSURE: 112 MMHG | DIASTOLIC BLOOD PRESSURE: 82 MMHG

## 2023-07-05 DIAGNOSIS — D21.9 FIBROID: ICD-10-CM

## 2023-07-05 DIAGNOSIS — Z96.0 VAGINAL PESSARY IN SITU: ICD-10-CM

## 2023-07-05 DIAGNOSIS — Z46.89 PESSARY MAINTENANCE: Primary | ICD-10-CM

## 2023-07-05 DIAGNOSIS — R92.8 ABNORMAL MAMMOGRAM: ICD-10-CM

## 2023-07-05 DIAGNOSIS — R32 URINARY INCONTINENCE, UNSPECIFIED TYPE: ICD-10-CM

## 2023-07-05 PROBLEM — B02.9 HERPES ZOSTER: Status: ACTIVE | Noted: 2023-07-05

## 2023-07-05 PROBLEM — G56.00 CARPAL TUNNEL SYNDROME: Status: ACTIVE | Noted: 2023-07-05

## 2023-07-05 PROBLEM — Z86.0100 HX OF COLONIC POLYPS: Status: ACTIVE | Noted: 2018-01-24

## 2023-07-05 PROBLEM — M54.12 BRACHIAL NEURITIS OR RADICULITIS: Status: ACTIVE | Noted: 2023-07-05

## 2023-07-05 PROBLEM — M54.16 LUMBAR RADICULOPATHY: Status: ACTIVE | Noted: 2023-07-05

## 2023-07-05 PROBLEM — Z86.010 HX OF COLONIC POLYPS: Status: ACTIVE | Noted: 2018-01-24

## 2023-07-05 PROBLEM — K57.30 DIVERTICULOSIS OF LARGE INTESTINE WITHOUT HEMORRHAGE: Status: ACTIVE | Noted: 2018-01-24

## 2023-07-05 PROBLEM — M20.20 ACQUIRED HALLUX RIGIDUS: Status: ACTIVE | Noted: 2023-07-05

## 2023-07-05 PROBLEM — Z87.42 HISTORY OF OVARIAN CYST: Status: ACTIVE | Noted: 2023-07-05

## 2023-07-05 PROBLEM — H25.9 AGE-RELATED CATARACT: Status: ACTIVE | Noted: 2018-09-29

## 2023-07-05 PROCEDURE — 99213 OFFICE O/P EST LOW 20 MIN: CPT | Performed by: NURSE PRACTITIONER

## 2023-07-05 RX ORDER — METRONIDAZOLE 7.5 MG/G
1 GEL VAGINAL
Qty: 45 G | Refills: 0 | Status: CANCELLED | OUTPATIENT
Start: 2023-07-05 | End: 2023-07-10

## 2023-07-05 RX ORDER — FLUCONAZOLE 150 MG/1
150 TABLET ORAL ONCE
Qty: 2 TABLET | Refills: 0 | Status: CANCELLED | OUTPATIENT
Start: 2023-07-05 | End: 2023-07-05

## 2023-07-05 RX ORDER — LISINOPRIL 20 MG/1
20 TABLET ORAL DAILY
COMMUNITY
Start: 2023-06-14 | End: 2024-06-13

## 2023-07-05 NOTE — PATIENT INSTRUCTIONS
Cystocele   WHAT YOU NEED TO KNOW:   What is a cystocele? A cystocele is a condition that causes part of your bladder to fall into your vagina. Weakened or stretched pelvic muscles are no longer able to hold the bladder in place. Your bladder may begin to slip through your vaginal opening. What increases my risk for a cystocele? Pregnancy and childbirth    Lower estrogen levels from older age or menopause    Anything that strains your pelvic muscles, such as obesity, chronic constipation or straining during bowel movements, severe coughing, or lifting heavy objects    Pressure in the pelvic area from a prolapsed bladder, rectum, or uterus    A hysterectomy or other pelvic surgery    A collagen disease, such as Marfan syndrome    What are the signs and symptoms of a cystocele? A soft bulge or lump in your vagina    Low back pain that is relieved when you lie down    Pelvic pain or pressure, especially when you urinate or have sex    Pink or red urine    Pressure in your abdomen, or you feel that you cannot completely empty your bladder    Difficult, painful, or frequent urination, especially at night    Urine leaks out when you cough, sneeze, or laugh    How is a cystocele diagnosed? Your healthcare provider will ask about your health history. This includes your lifestyle, past pregnancies, and any health conditions you have. You may need one or more of the following tests:  A pelvic exam  is used to check your bladder and structures around it. As you strain or bear down, your healthcare provider will be able to check the location and size of your cystocele. Your provider may also test the strength of your pelvic muscles. Cystoscopy  is used to check your bladder for stones, bleeding, tumors, or signs of infection. A thin tube with a scope is inserted into your urethra and moved up into your bladder. Blood and urine tests  may be used to check for an infection.     An ultrasound, x-ray, or MRI  may show problems with the area around your bladder. You may have x-rays taken while you urinate. Contrast liquid may be used to help your bladder show up better in pictures. Tell healthcare providers if you have ever had an allergic reaction to contrast liquid. Do not enter the MRI room with anything metal. Metal can cause serious injury. Tell providers if you have any metal in or on your body. Urodynamics  is a test to check if the muscles of your bladder are working properly. It also measures how much urine your bladder can hold. This test can also show whether your bladder fills and empties in a normal way. How is a cystocele treated? Depending on your symptoms, you may need any of the following:  Estrogen  may help strengthen the pelvic muscles and keep your cystocele from getting worse. This may be taken as a pill, applied as a cream, or inserted into your vagina. A pessary or tampon  can be placed inside the vagina to support the bulging tissues in your bladder and vagina. A pessary is a plastic or rubber ring and a tampon is a plug of cotton or other absorbent material.    Surgery  may be needed to lift your bladder back into place. Stitches or a mesh patch may be placed between your bladder and vagina to hold your bladder in place. What can I do to manage or prevent a cystocele? Do Kegel exercises regularly. These exercises can help your pelvic floor muscles get stronger. Tighten the muscles of your pelvis (the muscles you use to stop urinating). Hold the muscles tight for 5 seconds, then relax for 5 seconds. Gradually work up to holding the muscles for 10 seconds. Do at least 3 sets of 10 repetitions a day. Avoid straining. Do not lift heavy objects, stand for long periods of time, or strain to have a bowel movement. Prevent constipation by drinking more liquids and eating foods high in fiber. Ask how much liquid you should drink every day.  High-fiber foods include fresh fruits, vegetables, and whole grains. Maintain a healthy weight. Ask your healthcare provider for a healthy weight for you. Ask him or her to help you create a weight loss plan if you are overweight. He or she can also help you create an exercise program. Exercise helps your bowels work better and decreases pressure inside your colon. Keep a record. Record the number of times you urinate each day. Describe the color and amount of your urine. Bring the record to your follow-up visits. When should I seek immediate care? You have bleeding from your vagina that is not your monthly period. You have a mass bulging out of your vagina that you cannot push back in. You have severe lower abdominal pain. You have a bad-smelling discharge coming from your vagina. When should I call my doctor? You have a fever. You have chills or feel weak and achy. You have lower abdominal pain or back pain that does not go away. You cannot urinate. You have questions or concerns about your condition or care. CARE AGREEMENT:   You have the right to help plan your care. Learn about your health condition and how it may be treated. Discuss treatment options with your healthcare providers to decide what care you want to receive. You always have the right to refuse treatment. The above information is an  only. It is not intended as medical advice for individual conditions or treatments. Talk to your doctor, nurse or pharmacist before following any medical regimen to see if it is safe and effective for you. © Copyright Gilberto Caro 2022 Information is for End User's use only and may not be sold, redistributed or otherwise used for commercial purposes.

## 2023-07-05 NOTE — PROGRESS NOTES
Diagnoses and all orders for this visit:    Pessary maintenance    Vaginal pessary in situ    Fibroid  -     US pelvis complete w transvaginal; Future    Urinary incontinence, unspecified type    Other orders        -     Continue Vaginal estrace for which she does not need refills today. Call if no symptom improvement, all questions answered, return for annual.        Pleasant 78 y.o. here for pessary maintenance. She has a pessary for prolapse . She is no longer interested in surgical options. She states she is doing very well since starting vaginal Estrace and her discharge resolved. She denies fever or pelvic pain. She denies any sexual activity anymore although she states her  would like to. Her last annual exam was April 2022 (Medicare). Mammogram 10/20/2022 abnormal- follow up imaging reordered today. Sarai Lagos History reviewed. No pertinent past medical history. Past Surgical History:   Procedure Laterality Date   • BREAST CYST EXCISION Right 2017   • CYST REMOVAL Left 02/2023    arm     Social History     Tobacco Use   • Smoking status: Never   • Smokeless tobacco: Never   Vaping Use   • Vaping Use: Never used   Substance Use Topics   • Alcohol use:  Yes   • Drug use: Never     Family History   Problem Relation Age of Onset   • Breast cancer Maternal Aunt    • Colon cancer Neg Hx    • Ovarian cancer Neg Hx        Current Outpatient Medications:   •  estradiol (ESTRACE) 0.1 mg/g vaginal cream, INSERT 1 GRAM VAGINALLY 2 NIGHTS PER WEEK, Disp: 42.5 g, Rfl: 1  •  lisinopril (ZESTRIL) 20 mg tablet, Take 20 mg by mouth daily, Disp: , Rfl:   •  lisinopril-hydrochlorothiazide (PRINZIDE,ZESTORETIC) 20-12.5 MG per tablet, Take 1 tablet by mouth daily, Disp: , Rfl:   •  metFORMIN (GLUCOPHAGE) 500 mg tablet, Take 500 mg by mouth 2 (two) times a day with meals, Disp: , Rfl:   •  pravastatin (PRAVACHOL) 40 mg tablet, TAKE 1 TABLET(40 MG) BY MOUTH EVERY NIGHT, Disp: , Rfl:     No Known Allergies  OB History  Para Term  AB Living   1 0 0   1     SAB IAB Ectopic Multiple Live Births                  # Outcome Date GA Lbr Tadeo/2nd Weight Sex Delivery Anes PTL Lv   1 AB                Vitals:    23 1005   BP: 112/82   Weight: 70.3 kg (155 lb)   Height: 5' 4" (1.626 m)     Body mass index is 26.61 kg/m². No LMP recorded. Patient is postmenopausal.    Review of Systems   Constitutional: Negative for chills, fatigue, fever and unexpected weight change. Respiratory: Negative for shortness of breath. Gastrointestinal: Negative for anal bleeding, blood in stool, constipation and diarrhea. Genitourinary: Negative for difficulty urinating, dysuria and hematuria. Physical Exam   Constitutional: She appears well-developed and well-nourished. No distress. Alert and oriented. HENT: atraumatic  Head: Normocephalic. Neck: Normal range of motion. Neck supple. Pulmonary: Effort normal.  Abdominal: Soft. Pelvic exam was performed with patient supine. No labial fusion. There is no rash, tenderness, lesion or injury on the right labia. There is no rash, tenderness, lesion or injury on the left labia. Urethral meatus does not show any tenderness, inflammation or discharge. Palpation of midline bladder without pain or discomfort. Uterus is not deviated, not enlarged, not fixed and not tender. Cervix exhibits no motion tenderness, no discharge and no friability. Right adnexum displays no mass, no tenderness and no fullness. Left adnexum displays no mass, no tenderness and no fullness. No erythema or tenderness in the vagina. No foreign body in the vagina. No signs of injury around the vagina. Vaginal discharge found. Perineum and anus without areas of injury. No lesions noted or swelling. Pessary removed and replaced. No lacerations or erosions noted . +cystocele and uterine prolapse. +atrophy  Lymphadenopathy:        Right: No inguinal adenopathy present. Left: No inguinal adenopathy present.

## 2023-07-06 ENCOUNTER — HOSPITAL ENCOUNTER (OUTPATIENT)
Dept: ULTRASOUND IMAGING | Facility: HOSPITAL | Age: 78
End: 2023-07-06
Payer: MEDICARE

## 2023-07-06 DIAGNOSIS — D21.9 FIBROID: ICD-10-CM

## 2023-07-06 PROCEDURE — 76856 US EXAM PELVIC COMPLETE: CPT

## 2023-10-03 NOTE — PROGRESS NOTES
Diagnoses and all orders for this visit:    Pessary maintenance    Vaginal pessary in situ     -     Continue Vaginal estrace weekly for which she does not need refills today. Call if no symptom improvement, all questions answered, return for annual.        Pleasant 70 y.o. here for pessary maintenance. She has a pessary for bladder and uterine prolapse. She is not interested in surgical options. She states she is doing very well since starting vaginal Estrace and her discharge mostly resolved. She uses it once weekly to make it last. She denies fever or pelvic pain. She denies any sexual activity anymore although she states her  would like to. Her last annual exam was 2022 (Medicare). Mammogram 10/20/2022 abnormal- follow up imaging has not been done but she states she will call. Colonoscopy due again in 2024. No past medical history on file. Past Surgical History:   Procedure Laterality Date   • BREAST CYST EXCISION Right    • CYST REMOVAL Left 2023    arm     Social History     Tobacco Use   • Smoking status: Never   • Smokeless tobacco: Never   Vaping Use   • Vaping Use: Never used   Substance Use Topics   • Alcohol use:  Yes   • Drug use: Never     Family History   Problem Relation Age of Onset   • Breast cancer Maternal Aunt    • Colon cancer Neg Hx    • Ovarian cancer Neg Hx        Current Outpatient Medications:   •  estradiol (ESTRACE) 0.1 mg/g vaginal cream, INSERT 1 GRAM VAGINALLY 2 NIGHTS PER WEEK, Disp: 42.5 g, Rfl: 1  •  lisinopril (ZESTRIL) 20 mg tablet, Take 20 mg by mouth daily, Disp: , Rfl:   •  metFORMIN (GLUCOPHAGE) 500 mg tablet, Take 500 mg by mouth 2 (two) times a day with meals, Disp: , Rfl:   •  pravastatin (PRAVACHOL) 40 mg tablet, TAKE 1 TABLET(40 MG) BY MOUTH EVERY NIGHT, Disp: , Rfl:     No Known Allergies  OB History    Para Term  AB Living   1 0 0   1     SAB IAB Ectopic Multiple Live Births                  # Outcome Date GA Lbr Tadeo/2nd Weight Sex Delivery Anes PTL Lv   1 AB                Vitals:    10/04/23 1025   BP: 136/80   BP Location: Left arm   Patient Position: Sitting   Cuff Size: Standard   Weight: 71.7 kg (158 lb)   Height: 5' 4" (1.626 m)     Body mass index is 27.12 kg/m². No LMP recorded. Patient is postmenopausal.    Review of Systems   Constitutional: Negative for chills, fatigue, fever and unexpected weight change. Respiratory: Negative for shortness of breath. Gastrointestinal: Negative for anal bleeding, blood in stool, constipation and diarrhea. Genitourinary: Negative for difficulty urinating, dysuria and hematuria. Physical Exam   Constitutional: She appears well-developed and well-nourished. No distress. Alert and oriented. HENT: atraumatic  Head: Normocephalic. Neck: Normal range of motion. Neck supple. Pulmonary: Effort normal.  Abdominal: Soft. Pelvic exam was performed with patient supine. No labial fusion. There is no rash, tenderness, lesion or injury on the right labia. There is no rash, tenderness, lesion or injury on the left labia. Urethral meatus does not show any tenderness, inflammation or discharge. Palpation of midline bladder without pain or discomfort. Uterus is not deviated, not enlarged, not fixed and not tender. Cervix exhibits no motion tenderness, no discharge and no friability. Right adnexum displays no mass, no tenderness and no fullness. Left adnexum displays no mass, no tenderness and no fullness. No erythema or tenderness in the vagina. No foreign body in the vagina. No signs of injury around the vagina. Small amount of vaginal discharge found. Perineum and anus without areas of injury. No lesions noted or swelling. Pessary removed and replaced. No lacerations or erosions noted . +cystocele and uterine prolapse. +atrophy  Lymphadenopathy:        Right: No inguinal adenopathy present. Left: No inguinal adenopathy present.

## 2023-10-04 ENCOUNTER — OFFICE VISIT (OUTPATIENT)
Dept: OBGYN CLINIC | Facility: CLINIC | Age: 78
End: 2023-10-04
Payer: MEDICARE

## 2023-10-04 VITALS
HEIGHT: 64 IN | BODY MASS INDEX: 26.98 KG/M2 | SYSTOLIC BLOOD PRESSURE: 136 MMHG | WEIGHT: 158 LBS | DIASTOLIC BLOOD PRESSURE: 80 MMHG

## 2023-10-04 DIAGNOSIS — Z46.89 PESSARY MAINTENANCE: Primary | ICD-10-CM

## 2023-10-04 DIAGNOSIS — Z96.0 VAGINAL PESSARY IN SITU: ICD-10-CM

## 2023-10-04 PROBLEM — E11.69 HYPERLIPIDEMIA DUE TO TYPE 2 DIABETES MELLITUS: Status: ACTIVE | Noted: 2023-09-15

## 2023-10-04 PROBLEM — E78.5 HYPERLIPIDEMIA DUE TO TYPE 2 DIABETES MELLITUS: Status: ACTIVE | Noted: 2023-09-15

## 2023-10-04 PROCEDURE — 99213 OFFICE O/P EST LOW 20 MIN: CPT | Performed by: NURSE PRACTITIONER

## 2023-10-04 NOTE — PATIENT INSTRUCTIONS
Pessary   AMBULATORY CARE:   What you need to know about a pessary:  A pessary is a small silicone device that fits in your vagina. A pessary helps support your bladder, uterus, vagina, or rectum. A pessary is used for urinary incontinence, or sagging of your organs into your vagina. What will happen during pessary insertion:   There are many different kinds of pessaries. Your healthcare provider will work with you to find one that fits properly and is comfortable. Your healthcare provider will insert the pessary. He or she will teach you how to insert the pessary correctly. He or she will also teach you how to remove the pessary for cleaning. What will happen after pessary insertion:  You may need to return in 1 to 2 days to have the pessary checked. You may have more vaginal discharge than is normal for you. Risks of a pessary: Your discharge may develop an odor. You may have bleeding from your vagina. Your vagina may become irritated from the pessary. You may develop sores in your vagina. Call your doctor if:   You are bleeding from your vagina. You have trouble urinating or having a bowel movement. You have discomfort or pain with the pessary in place. You have burning, itching, or irritation in your vagina from the pessary. Your pessary keeps falling out. This may be a sign that it is too small. You have questions or concerns about your condition or care. Care for yourself at home:   Clean your pessary  with soap and water. You can do this every few days, or as directed by your healthcare provider. You may be able to wear the pessary during sexual intercourse. Ask your healthcare provider if sexual intercourse is okay with your pessary in place. If your vaginal discharge has an odor,  use vaginal gel or clean your pessary more often. The pessary can fall out  if you strain too hard or lift heavy objects.  However, the pessary cannot move anywhere else inside your body.    Do pelvic muscle exercises. These are also called Kegel exercises. These exercises help strengthen your pelvic muscles and help prevent urine leakage. Tighten the muscles of your pelvis and hold them tight for 5 seconds. Then relax for 5 seconds. Gradually work up to tightening them for 10 seconds and relaxing for 10 seconds. Do this 3 times each day. Follow up with your doctor as directed: You may need to return in 1 to 2 days to check the placement of the pessary. The size or shape may need to be changed to be comfortable. After that you may need to return every few weeks or months for a checkup. Write down your questions so you remember to ask them during your visits. © Copyright Aldo Zacarias 2023 Information is for End User's use only and may not be sold, redistributed or otherwise used for commercial purposes. The above information is an  only. It is not intended as medical advice for individual conditions or treatments. Talk to your doctor, nurse or pharmacist before following any medical regimen to see if it is safe and effective for you.

## 2024-01-24 ENCOUNTER — OFFICE VISIT (OUTPATIENT)
Dept: OBGYN CLINIC | Facility: CLINIC | Age: 79
End: 2024-01-24
Payer: COMMERCIAL

## 2024-01-24 VITALS — SYSTOLIC BLOOD PRESSURE: 118 MMHG | BODY MASS INDEX: 27.29 KG/M2 | DIASTOLIC BLOOD PRESSURE: 78 MMHG | WEIGHT: 159 LBS

## 2024-01-24 DIAGNOSIS — Z46.89 PESSARY MAINTENANCE: ICD-10-CM

## 2024-01-24 DIAGNOSIS — Z96.0 VAGINAL PESSARY IN SITU: Primary | ICD-10-CM

## 2024-01-24 PROCEDURE — 99213 OFFICE O/P EST LOW 20 MIN: CPT | Performed by: NURSE PRACTITIONER

## 2024-01-24 NOTE — PATIENT INSTRUCTIONS
Vaginal Atrophy   WHAT YOU NEED TO KNOW:   What is vaginal atrophy?  Vaginal atrophy is a condition that causes thinning, drying, and inflammation of vaginal tissue. This condition is caused by decreased levels of estrogen (a female sex hormone). Vaginal atrophy can increase your risk for vaginal and urinary tract infections. Vaginal atrophy can worsen over time if not treated.   What causes or increases your risk of vaginal atrophy?   Menopause     Medicines that lower your estrogen levels, such as those used to treat breast cancer, endometriosis, or fibroids    Radiation to your pelvic area     Surgery to remove the ovaries    Breastfeeding    What are the signs and symptoms of vaginal atrophy?   Vaginal dryness, itching, and burning    Vaginal discharge    Pain or discomfort during sex    Light bleeding after sex    Burning during urination    Frequent, sudden, strong urges to urinate    Urinary incontinence (loss of control of your bladder)    How is vaginal atrophy diagnosed?  Your healthcare provider will ask about your symptoms. A pelvic exam will be done to examine your vagina and cervix. Your healthcare provider will place a speculum into your vagina to open and examine it. A sample of discharge from your vagina may be collected and tested. A urine test may also be done.   How is vaginal atrophy treated?   Over-the counter vaginal moisturizers  can help reduce dryness. Your healthcare provider may recommend that you use a vaginal moisturizer several times each week and during sex. Only use creams that are made for vaginal use. Do  not  use petroleum jelly. Lubricants can be used during sex to decrease pain and discomfort.     Estrogen  may help decrease dryness. It may also lower your risk of vaginal infections if you are going through menopause. It can also help to relieve urinary symptoms. Estrogen may be prescribed in the form of a cream, tablet, or ring. These medicines can be applied or inserted into  the vagina. Estrogen can also be prescribed in the form of a pill.    When should I contact my healthcare provider?   You have a foul-smelling odor coming from your vagina.     You have a thick, cheese-like discharge from your vagina.     You have itching, swelling, or redness in your vagina.     You have pain or burning when you urinate.     Your urine smells bad.     Your symptoms do not improve, or they get worse.     You have questions or concerns about your condition or care.    CARE AGREEMENT:   You have the right to help plan your care. Learn about your health condition and how it may be treated. Discuss treatment options with your healthcare providers to decide what care you want to receive. You always have the right to refuse treatment. The above information is an  only. It is not intended as medical advice for individual conditions or treatments. Talk to your doctor, nurse or pharmacist before following any medical regimen to see if it is safe and effective for you.  © Copyright [employer] 2023 Information is for End User's use only and may not be sold, redistributed or otherwise used for commercial purposes.

## 2024-01-24 NOTE — PROGRESS NOTES
Diagnoses and all orders for this visit:    Pessary maintenance    Vaginal pessary in situ    Urinary incontinence, unspecified type    Other orders        -     Continue Vaginal estrace for which she does not need refills today.    Call if no symptom improvement, all questions answered, return for annual.        Pleasant 78 y.o. here for pessary maintenance.  She has a pessary for prolapse.  She is no longer interested in surgical options. She states she is doing very well since starting vaginal Estrace and her discharge resolved. She uses it weekly. She denies fever or pelvic pain. She denies any sexual activity anymore although she states her  would like to. Her last annual exam was 2022 (Medicare). Mammogram 10/20/2022 abnormal- follow up imaging has still not been done. She was reminded to do this again.    History reviewed. No pertinent past medical history.  Past Surgical History:   Procedure Laterality Date    BREAST CYST EXCISION Right 2017    CYST REMOVAL Left 2023    arm     Social History     Tobacco Use    Smoking status: Never    Smokeless tobacco: Never   Vaping Use    Vaping status: Never Used   Substance Use Topics    Alcohol use: Yes    Drug use: Never     Family History   Problem Relation Age of Onset    Breast cancer Maternal Aunt     Colon cancer Neg Hx     Ovarian cancer Neg Hx        Current Outpatient Medications:     estradiol (ESTRACE) 0.1 mg/g vaginal cream, INSERT 1 GRAM VAGINALLY 2 NIGHTS PER WEEK, Disp: 42.5 g, Rfl: 1    lisinopril (ZESTRIL) 20 mg tablet, Take 20 mg by mouth daily, Disp: , Rfl:     metFORMIN (GLUCOPHAGE) 500 mg tablet, Take 500 mg by mouth 2 (two) times a day with meals, Disp: , Rfl:     pravastatin (PRAVACHOL) 40 mg tablet, TAKE 1 TABLET(40 MG) BY MOUTH EVERY NIGHT, Disp: , Rfl:     No Known Allergies  OB History    Para Term  AB Living   1 0 0   1     SAB IAB Ectopic Multiple Live Births                  # Outcome Date GA Lbr Tadeo/2nd  Weight Sex Delivery Anes PTL Lv   1 AB                Vitals:    01/24/24 1444   BP: 118/78   BP Location: Left arm   Patient Position: Sitting   Cuff Size: Standard   Weight: 72.1 kg (159 lb)     Body mass index is 27.29 kg/m².  No LMP recorded. Patient is postmenopausal.    Review of Systems   Constitutional: Negative for chills, fatigue, fever and unexpected weight change.   Respiratory: Negative for shortness of breath.    Gastrointestinal: Negative for anal bleeding, blood in stool, constipation and diarrhea.   Genitourinary: Negative for difficulty urinating, dysuria and hematuria.     Physical Exam   Constitutional: She appears well-developed and well-nourished. No distress. Alert and oriented.  HENT: atraumatic  Head: Normocephalic.   Neck: Normal range of motion. Neck supple.   Pulmonary: Effort normal.  Abdominal: Soft.   Pelvic exam was performed with patient supine. No labial fusion. There is no rash, tenderness, lesion or injury on the right labia. There is no rash, tenderness, lesion or injury on the left labia. Urethral meatus does not show any tenderness, inflammation or discharge. Palpation of midline bladder without pain or discomfort. Uterus is not deviated, not enlarged, not fixed and not tender. Cervix exhibits no motion tenderness, no discharge and no friability. Right adnexum displays no mass, no tenderness and no fullness. Left adnexum displays no mass, no tenderness and no fullness. No erythema or tenderness in the vagina. No foreign body in the vagina. No signs of injury around the vagina. Vaginal discharge found. Perineum and anus without areas of injury. No lesions noted or swelling. Pessary removed and replaced. No lacerations or erosions noted . +cystocele and uterine prolapse. +atrophy  Lymphadenopathy:        Right: No inguinal adenopathy present.        Left: No inguinal adenopathy present.

## 2024-09-17 ENCOUNTER — OFFICE VISIT (OUTPATIENT)
Dept: GASTROENTEROLOGY | Facility: CLINIC | Age: 79
End: 2024-09-17
Payer: COMMERCIAL

## 2024-09-17 VITALS
WEIGHT: 160.4 LBS | SYSTOLIC BLOOD PRESSURE: 122 MMHG | HEART RATE: 84 BPM | HEIGHT: 64 IN | BODY MASS INDEX: 27.39 KG/M2 | OXYGEN SATURATION: 97 % | TEMPERATURE: 98.5 F | DIASTOLIC BLOOD PRESSURE: 80 MMHG

## 2024-09-17 DIAGNOSIS — Z86.010 PERSONAL HISTORY OF COLONIC POLYPS: Primary | ICD-10-CM

## 2024-09-17 PROCEDURE — 99214 OFFICE O/P EST MOD 30 MIN: CPT | Performed by: INTERNAL MEDICINE

## 2024-09-17 PROCEDURE — G2211 COMPLEX E/M VISIT ADD ON: HCPCS | Performed by: INTERNAL MEDICINE

## 2024-09-17 NOTE — PATIENT INSTRUCTIONS
Scheduled date of colonoscopy (as of today):9/26/24  Physician performing colonoscopy:Igor  Location of colonoscopy:[place]  Bowel prep reviewed with patient:miralax/dulcolax  Instructions reviewed with patient by:Berna MACK  Clearances:  none

## 2024-09-17 NOTE — H&P (VIEW-ONLY)
Follow-up Note -  [facility]  Claire Vyas 1945 79 y.o. female     ASSESSMENT @ PLAN:   She is a 79-year-old female with a colonoscopy at [facility] in September 2021 with 3 polyps removed and diverticulosis she is due for recall.  She also has slow motility constipation and tries to eat a high-fiber diet.    1 colonoscopy to investigate parallax prep and she will drink 1 bottle of magnesium citrate and stay well-hydrated 2 days out    Reason: History of colon polyps and constipation    HPI: She is a 79-year-old female with chronic constipation and history of colon polyps who is due for surveillance colonoscopy.  She moved here from [facility].  She had a colonoscopy at [facility] with 3 polyps removed diverticulosis in September 2021.  I did review this at her last visit.  She is due for colonoscopy.  She has no blood in her stool no black stool nobody in the family had colon cancer.  She does take MiraLAX and a high-fiber diet for her chronic constipation.  She does split her bowel prep up last time we talked about adding a bottle of magnesium citrate to ensure that she has an adequate bowel preparation.    REVIEW OF SYSTEMS:     CONSTITUTIONAL: Denies any fever, chills, or rigors. Good appetite, and no recent weight loss.  HEENT: No earache or tinnitus. Denies hearing loss or visual disturbances.  CARDIOVASCULAR: No chest pain or palpitations.   RESPIRATORY: Denies any cough, hemoptysis, shortness of breath or dyspnea on exertion.  GASTROINTESTINAL: As noted in the History of Present Illness.   GENITOURINARY: No problems with urination. Denies any hematuria or dysuria.  NEUROLOGIC: No dizziness or vertigo, denies headaches.   MUSCULOSKELETAL: Denies any muscle or joint pain.   SKIN: Denies skin rashes or itching.   ENDOCRINE: Denies excessive thirst. Denies intolerance to heat or cold.  PSYCHOSOCIAL: Denies depression or anxiety. Denies any recent  "memory loss.     History reviewed. No pertinent past medical history.   Past Surgical History:   Procedure Laterality Date    BREAST CYST EXCISION Right 2017    CYST REMOVAL Left 02/2023    arm     Social History     Socioeconomic History    Marital status: /Civil Union     Spouse name: Not on file    Number of children: Not on file    Years of education: Not on file    Highest education level: Not on file   Occupational History    Not on file   Tobacco Use    Smoking status: Never    Smokeless tobacco: Never   Vaping Use    Vaping status: Never Used   Substance and Sexual Activity    Alcohol use: Yes    Drug use: Never    Sexual activity: Not Currently   Other Topics Concern    Not on file   Social History Narrative    Not on file     Social Determinants of Health     Financial Resource Strain: Not on file   Food Insecurity: Not on file   Transportation Needs: Not on file   Physical Activity: Not on file   Stress: Not on file   Social Connections: Not on file   Intimate Partner Violence: Not on file   Housing Stability: Not on file     Family History   Problem Relation Age of Onset    Breast cancer Maternal Aunt     Colon cancer Neg Hx     Ovarian cancer Neg Hx      Patient has no known allergies.  Current Outpatient Medications   Medication Sig Dispense Refill    estradiol (ESTRACE) 0.1 mg/g vaginal cream INSERT 1 GRAM VAGINALLY 2 NIGHTS PER WEEK 42.5 g 1    lisinopril (ZESTRIL) 20 mg tablet Take 20 mg by mouth daily      metFORMIN (GLUCOPHAGE) 500 mg tablet Take 500 mg by mouth 2 (two) times a day with meals      pravastatin (PRAVACHOL) 40 mg tablet TAKE 1 TABLET(40 MG) BY MOUTH EVERY NIGHT       No current facility-administered medications for this visit.       Blood pressure 122/80, pulse 84, temperature 98.5 °F (36.9 °C), temperature source Tympanic, height 5' 4\" (1.626 m), weight 72.8 kg (160 lb 6.4 oz), SpO2 97%.    PHYSICAL EXAM:     General Appearance:   Alert, cooperative, no distress, appears " "stated age    HEENT:   Normocephalic, atraumatic, anicteric.     Neck:  Supple, symmetrical, trachea midline, no adenopathy;    thyroid: no enlargement/tenderness/nodules; no carotid  bruit or JVD    Lungs:   Clear to auscultation bilaterally; no rales, rhonchi or wheezing; respirations unlabored    Heart::   S1 and S2 normal; regular rate and rhythm; no murmur, rub, or gallop.   Abdomen:   Soft, non-tender, non-distended; normal bowel sounds; no masses, no organomegaly    Genitalia:   Deferred    Rectal:   Deferred    Extremities:  No cyanosis, clubbing or edema    Pulses:  2+ and symmetric all extremities    Skin:  Skin color, texture, turgor normal, no rashes or lesions    Lymph nodes:  No palpable cervical, axillary or inguinal lymphadenopathy        Lab Results   Component Value Date    WBC 7.50 12/12/2022    HGB 13.5 12/12/2022    HCT 43.7 12/12/2022    MCV 99 (H) 12/12/2022     12/12/2022     Lab Results   Component Value Date    CALCIUM 9.3 09/04/2024    K 4.2 09/04/2024    CO2 29 09/04/2024     09/04/2024    BUN 23 09/04/2024    CREATININE 0.81 09/04/2024     Lab Results   Component Value Date    ALT 22 09/04/2024    AST 19 09/04/2024    ALKPHOS 41 09/04/2024     No results found for: \"INR\", \"PROTIME\"          "

## 2024-09-17 NOTE — PROGRESS NOTES
Follow-up Note -  [facility]  Claire Vyas 1945 79 y.o. female     ASSESSMENT @ PLAN:   She is a 79-year-old female with a colonoscopy at [facility] in September 2021 with 3 polyps removed and diverticulosis she is due for recall.  She also has slow motility constipation and tries to eat a high-fiber diet.    1 colonoscopy to investigate parallax prep and she will drink 1 bottle of magnesium citrate and stay well-hydrated 2 days out    Reason: History of colon polyps and constipation    HPI: She is a 79-year-old female with chronic constipation and history of colon polyps who is due for surveillance colonoscopy.  She moved here from [facility].  She had a colonoscopy at [facility] with 3 polyps removed diverticulosis in September 2021.  I did review this at her last visit.  She is due for colonoscopy.  She has no blood in her stool no black stool nobody in the family had colon cancer.  She does take MiraLAX and a high-fiber diet for her chronic constipation.  She does split her bowel prep up last time we talked about adding a bottle of magnesium citrate to ensure that she has an adequate bowel preparation.    REVIEW OF SYSTEMS:     CONSTITUTIONAL: Denies any fever, chills, or rigors. Good appetite, and no recent weight loss.  HEENT: No earache or tinnitus. Denies hearing loss or visual disturbances.  CARDIOVASCULAR: No chest pain or palpitations.   RESPIRATORY: Denies any cough, hemoptysis, shortness of breath or dyspnea on exertion.  GASTROINTESTINAL: As noted in the History of Present Illness.   GENITOURINARY: No problems with urination. Denies any hematuria or dysuria.  NEUROLOGIC: No dizziness or vertigo, denies headaches.   MUSCULOSKELETAL: Denies any muscle or joint pain.   SKIN: Denies skin rashes or itching.   ENDOCRINE: Denies excessive thirst. Denies intolerance to heat or cold.  PSYCHOSOCIAL: Denies depression or anxiety. Denies any recent  "memory loss.     History reviewed. No pertinent past medical history.   Past Surgical History:   Procedure Laterality Date    BREAST CYST EXCISION Right 2017    CYST REMOVAL Left 02/2023    arm     Social History     Socioeconomic History    Marital status: /Civil Union     Spouse name: Not on file    Number of children: Not on file    Years of education: Not on file    Highest education level: Not on file   Occupational History    Not on file   Tobacco Use    Smoking status: Never    Smokeless tobacco: Never   Vaping Use    Vaping status: Never Used   Substance and Sexual Activity    Alcohol use: Yes    Drug use: Never    Sexual activity: Not Currently   Other Topics Concern    Not on file   Social History Narrative    Not on file     Social Determinants of Health     Financial Resource Strain: Not on file   Food Insecurity: Not on file   Transportation Needs: Not on file   Physical Activity: Not on file   Stress: Not on file   Social Connections: Not on file   Intimate Partner Violence: Not on file   Housing Stability: Not on file     Family History   Problem Relation Age of Onset    Breast cancer Maternal Aunt     Colon cancer Neg Hx     Ovarian cancer Neg Hx      Patient has no known allergies.  Current Outpatient Medications   Medication Sig Dispense Refill    estradiol (ESTRACE) 0.1 mg/g vaginal cream INSERT 1 GRAM VAGINALLY 2 NIGHTS PER WEEK 42.5 g 1    lisinopril (ZESTRIL) 20 mg tablet Take 20 mg by mouth daily      metFORMIN (GLUCOPHAGE) 500 mg tablet Take 500 mg by mouth 2 (two) times a day with meals      pravastatin (PRAVACHOL) 40 mg tablet TAKE 1 TABLET(40 MG) BY MOUTH EVERY NIGHT       No current facility-administered medications for this visit.       Blood pressure 122/80, pulse 84, temperature 98.5 °F (36.9 °C), temperature source Tympanic, height 5' 4\" (1.626 m), weight 72.8 kg (160 lb 6.4 oz), SpO2 97%.    PHYSICAL EXAM:     General Appearance:   Alert, cooperative, no distress, appears " "stated age    HEENT:   Normocephalic, atraumatic, anicteric.     Neck:  Supple, symmetrical, trachea midline, no adenopathy;    thyroid: no enlargement/tenderness/nodules; no carotid  bruit or JVD    Lungs:   Clear to auscultation bilaterally; no rales, rhonchi or wheezing; respirations unlabored    Heart::   S1 and S2 normal; regular rate and rhythm; no murmur, rub, or gallop.   Abdomen:   Soft, non-tender, non-distended; normal bowel sounds; no masses, no organomegaly    Genitalia:   Deferred    Rectal:   Deferred    Extremities:  No cyanosis, clubbing or edema    Pulses:  2+ and symmetric all extremities    Skin:  Skin color, texture, turgor normal, no rashes or lesions    Lymph nodes:  No palpable cervical, axillary or inguinal lymphadenopathy        Lab Results   Component Value Date    WBC 7.50 12/12/2022    HGB 13.5 12/12/2022    HCT 43.7 12/12/2022    MCV 99 (H) 12/12/2022     12/12/2022     Lab Results   Component Value Date    CALCIUM 9.3 09/04/2024    K 4.2 09/04/2024    CO2 29 09/04/2024     09/04/2024    BUN 23 09/04/2024    CREATININE 0.81 09/04/2024     Lab Results   Component Value Date    ALT 22 09/04/2024    AST 19 09/04/2024    ALKPHOS 41 09/04/2024     No results found for: \"INR\", \"PROTIME\"          "

## 2024-09-26 ENCOUNTER — ANESTHESIA EVENT (OUTPATIENT)
Dept: GASTROENTEROLOGY | Facility: HOSPITAL | Age: 79
End: 2024-09-26
Payer: COMMERCIAL

## 2024-09-26 ENCOUNTER — HOSPITAL ENCOUNTER (OUTPATIENT)
Dept: GASTROENTEROLOGY | Facility: HOSPITAL | Age: 79
Setting detail: OUTPATIENT SURGERY
End: 2024-09-26
Attending: INTERNAL MEDICINE
Payer: COMMERCIAL

## 2024-09-26 ENCOUNTER — ANESTHESIA (OUTPATIENT)
Dept: GASTROENTEROLOGY | Facility: HOSPITAL | Age: 79
End: 2024-09-26
Payer: COMMERCIAL

## 2024-09-26 VITALS
WEIGHT: 154.1 LBS | OXYGEN SATURATION: 94 % | RESPIRATION RATE: 20 BRPM | SYSTOLIC BLOOD PRESSURE: 108 MMHG | TEMPERATURE: 99.1 F | HEIGHT: 63 IN | DIASTOLIC BLOOD PRESSURE: 60 MMHG | BODY MASS INDEX: 27.3 KG/M2 | HEART RATE: 72 BPM

## 2024-09-26 DIAGNOSIS — Z86.0100 PERSONAL HISTORY OF COLONIC POLYPS: ICD-10-CM

## 2024-09-26 DIAGNOSIS — Z86.010 PERSONAL HISTORY OF COLONIC POLYPS: ICD-10-CM

## 2024-09-26 PROCEDURE — G0121 COLON CA SCRN NOT HI RSK IND: HCPCS | Performed by: INTERNAL MEDICINE

## 2024-09-26 RX ORDER — SODIUM CHLORIDE, SODIUM LACTATE, POTASSIUM CHLORIDE, CALCIUM CHLORIDE 600; 310; 30; 20 MG/100ML; MG/100ML; MG/100ML; MG/100ML
INJECTION, SOLUTION INTRAVENOUS CONTINUOUS PRN
Status: DISCONTINUED | OUTPATIENT
Start: 2024-09-26 | End: 2024-09-26

## 2024-09-26 RX ORDER — PROPOFOL 10 MG/ML
INJECTION, EMULSION INTRAVENOUS AS NEEDED
Status: DISCONTINUED | OUTPATIENT
Start: 2024-09-26 | End: 2024-09-26

## 2024-09-26 RX ADMIN — PROPOFOL 50 MG: 10 INJECTION, EMULSION INTRAVENOUS at 08:19

## 2024-09-26 RX ADMIN — PROPOFOL 50 MG: 10 INJECTION, EMULSION INTRAVENOUS at 08:12

## 2024-09-26 RX ADMIN — PROPOFOL 100 MG: 10 INJECTION, EMULSION INTRAVENOUS at 08:10

## 2024-09-26 RX ADMIN — PROPOFOL 50 MG: 10 INJECTION, EMULSION INTRAVENOUS at 08:16

## 2024-09-26 RX ADMIN — SODIUM CHLORIDE, SODIUM LACTATE, POTASSIUM CHLORIDE, AND CALCIUM CHLORIDE: .6; .31; .03; .02 INJECTION, SOLUTION INTRAVENOUS at 07:57

## 2024-09-26 NOTE — INTERVAL H&P NOTE
H&P reviewed. After examining the patient I find no changes in the patients condition since the H&P had been written.    Vitals:    09/26/24 0748   BP: 134/63   Pulse: 76   Resp: 16   Temp: 97.6 °F (36.4 °C)   SpO2: 98%

## 2024-09-26 NOTE — ANESTHESIA PREPROCEDURE EVALUATION
"\"79-year-old female with chronic constipation and history of colon polyps who is due for surveillance colonoscopy. She moved here from [facility]. She had a colonoscopy at [facility] with 3 polyps removed diverticulosis in September 2021. \"    Procedure:  COLONOSCOPY    Relevant Problems   CARDIO   (+) Benign essential hypertension   (+) Hyperlipidemia due to type 2 diabetes mellitus  (HCC)      ENDO   (+) Type 2 diabetes mellitus with microalbuminuria, without long-term current use of insulin (HCC)      GI/HEPATIC   (+) Fatty liver      /RENAL   (+) Diabetic nephropathy (HCC)      MUSCULOSKELETAL   (+) Arthritis of right acromioclavicular joint        Physical Exam    Airway    Mallampati score: II  TM Distance: >3 FB  Neck ROM: full     Dental   No notable dental hx     Cardiovascular  Rhythm: regular, Rate: normal, No weak pulses    Pulmonary   No stridor    Other Findings  post-pubertal.      Anesthesia Plan  ASA Score- 2     Anesthesia Type- IV sedation with anesthesia with ASA Monitors.         Additional Monitors:     Airway Plan:            Plan Factors-    Chart reviewed.   Existing labs reviewed. Patient summary reviewed.                  Induction- intravenous.    Postoperative Plan-         Informed Consent- Anesthetic plan and risks discussed with patient.  I personally reviewed this patient with the CRNA. Discussed and agreed on the Anesthesia Plan with the CRNA..        "

## 2024-09-26 NOTE — ANESTHESIA POSTPROCEDURE EVALUATION
Post-Op Assessment Note    CV Status:  Stable  Pain Score: 0    Pain management: adequate       Mental Status:  Sleepy and arousable   Hydration Status:  Stable   PONV Controlled:  Controlled   Airway Patency:  Patent     Post Op Vitals Reviewed: Yes    No anethesia notable event occurred.    Staff: CRNA               BP   108/67   Temp      Pulse  71   Resp   12   SpO2   98

## 2024-10-21 ENCOUNTER — NURSE TRIAGE (OUTPATIENT)
Age: 79
End: 2024-10-21

## 2024-10-21 DIAGNOSIS — R39.9 UTI SYMPTOMS: Primary | ICD-10-CM

## 2024-10-21 NOTE — TELEPHONE ENCOUNTER
"Patient caling to report UTI symptoms. Notes frequency, urgency, burning, and some blood in urine Saturday. Denies any further blood and other symptoms have most resolve. UA/CS ordered to rule out UTI.     Reason for Disposition   Urinating more frequently than usual (i.e., frequency)    Answer Assessment - Initial Assessment Questions  1. SYMPTOM: \"What's the main symptom you're concerned about?\" (e.g., frequency, incontinence)      Frequency/urgency, burning, blood in urine (non since Saturday)  2. ONSET: \"When did the  s/s  start?\"      Saturday  3. PAIN: \"Is there any pain?\" If Yes, ask: \"How bad is it?\" (Scale: 1-10; mild, moderate, severe)      denies  4. CAUSE: \"What do you think is causing the symptoms?\"      Poss uti  5. OTHER SYMPTOMS: \"Do you have any other symptoms?\" (e.g., fever, flank pain, blood in urine, pain with urination)      Denies abnormal discharge or odor, urinary odor  6. PREGNANCY: \"Is there any chance you are pregnant?\" \"When was your last menstrual period?\"      postmeno    Protocols used: Urinary Symptoms-ADULT-[place]    " Care Transitions case update: patient was readmitted as an observation admission to [facility] on 4/21 for chest pain with moderate risk of ACS. I s/w her RN Leona who informed the plan was for a CTA and ECHO. I requested she inform CTN if discharged today. Will continue to monitor for discharge from [facility].

## 2024-10-24 ENCOUNTER — OFFICE VISIT (OUTPATIENT)
Age: 79
End: 2024-10-24
Payer: COMMERCIAL

## 2024-10-24 VITALS
HEIGHT: 63 IN | WEIGHT: 157 LBS | SYSTOLIC BLOOD PRESSURE: 120 MMHG | DIASTOLIC BLOOD PRESSURE: 80 MMHG | BODY MASS INDEX: 27.82 KG/M2

## 2024-10-24 DIAGNOSIS — E11.29 TYPE 2 DIABETES MELLITUS WITH MICROALBUMINURIA, WITHOUT LONG-TERM CURRENT USE OF INSULIN (HCC): ICD-10-CM

## 2024-10-24 DIAGNOSIS — R39.9 UTI SYMPTOMS: Primary | ICD-10-CM

## 2024-10-24 DIAGNOSIS — R92.8 ABNORMAL MAMMOGRAM: ICD-10-CM

## 2024-10-24 DIAGNOSIS — Z96.0 VAGINAL PESSARY IN SITU: ICD-10-CM

## 2024-10-24 DIAGNOSIS — R80.9 TYPE 2 DIABETES MELLITUS WITH MICROALBUMINURIA, WITHOUT LONG-TERM CURRENT USE OF INSULIN (HCC): ICD-10-CM

## 2024-10-24 PROBLEM — M17.12 PATELLOFEMORAL ARTHRITIS OF LEFT KNEE: Status: ACTIVE | Noted: 2024-03-08

## 2024-10-24 PROCEDURE — 99213 OFFICE O/P EST LOW 20 MIN: CPT | Performed by: NURSE PRACTITIONER

## 2024-10-24 RX ORDER — SULFAMETHOXAZOLE AND TRIMETHOPRIM 800; 160 MG/1; MG/1
1 TABLET ORAL EVERY 12 HOURS SCHEDULED
Qty: 6 TABLET | Refills: 0 | Status: SHIPPED | OUTPATIENT
Start: 2024-10-24 | End: 2024-10-27

## 2024-10-24 NOTE — PROGRESS NOTES
Diagnoses and all orders for this visit:    UTI symptoms  -     POCT urine dip  -     Urine culture; Future  -     sulfamethoxazole-trimethoprim (BACTRIM DS) 800-160 mg per tablet; Take 1 tablet by mouth every 12 (twelve) hours for 3 days    Abnormal mammogram  -     Mammo diagnostic bilateral w 3d and cad; Future    Type 2 diabetes mellitus with microalbuminuria, without long-term current use of insulin ([facility])  Comments:  Encouraged vigilance with her diet since her hemoglobin A1c seems to have risen.    Vaginal pessary in situ  Comments:  Return for a cleaning      Call if no symptom improvement, all questions answered, return for annual and pessary cleaning.            Pleasant 79 y.o. postmenopausal female patient here for urinary complaints of hesitancy and frequency. She also had burning with urination on Saturday and blood when she wiped. She states her symptoms are worsening. She does not have a frequent UTI history. She denies fever or pelvic pain. She denies vaginal complaints of itching, odor or discharge.  She did douche to see if it would help with her symptoms and used her vaginal estrogen.  Incidentally noted, she has not followed up her mammogram gram from 2022 which was abnormal.  A new order was placed so she can schedule this.  She will return for pessary cleaning and reevaluation of her urinary symptoms.  She was unable to give a urine sample today.    Past Medical History:   Diagnosis Date    Hypertension      Past Surgical History:   Procedure Laterality Date    APPENDECTOMY      BREAST CYST EXCISION Right 2017    CYST REMOVAL Left 02/2023    arm    DENTAL IMPLANT       Social History     Tobacco Use    Smoking status: Former     Types: Cigarettes    Smokeless tobacco: Never    Tobacco comments:     Quit in 30's   Vaping Use    Vaping status: Never Used   Substance Use Topics    Alcohol use: Yes     Comment: 2x per month    Drug use: Never     Family History   Problem Relation Age of Onset  "   Breast cancer Maternal Aunt     Colon cancer Neg Hx     Ovarian cancer Neg Hx        Current Outpatient Medications:     estradiol (ESTRACE) 0.1 mg/g vaginal cream, INSERT 1 GRAM VAGINALLY 2 NIGHTS PER WEEK, Disp: 42.5 g, Rfl: 1    lisinopril (ZESTRIL) 20 mg tablet, Take 20 mg by mouth daily at bedtime, Disp: , Rfl:     metFORMIN (GLUCOPHAGE) 500 mg tablet, Take 500 mg by mouth 2 (two) times a day with meals, Disp: , Rfl:     pravastatin (PRAVACHOL) 40 mg tablet, TAKE 1 TABLET(40 MG) BY MOUTH EVERY NIGHT, Disp: , Rfl:     sulfamethoxazole-trimethoprim (BACTRIM DS) 800-160 mg per tablet, Take 1 tablet by mouth every 12 (twelve) hours for 3 days, Disp: 6 tablet, Rfl: 0    No Known Allergies  OB History    Para Term  AB Living   1 0 0   1     SAB IAB Ectopic Multiple Live Births                  # Outcome Date GA Lbr Tadeo/2nd Weight Sex Type Anes PTL Lv   1 AB                Vitals:    10/24/24 1025   BP: 120/80   Weight: 71.2 kg (157 lb)   Height: 5' 3\" (1.6 m)     Body mass index is 27.81 kg/m².  No LMP recorded. Patient is postmenopausal.    Review of Systems   Constitutional: Negative for chills, fatigue, fever and unexpected weight change.   Respiratory: Negative for shortness of breath.    Gastrointestinal: Negative for anal bleeding, blood in stool, constipation and diarrhea.   Genitourinary: Negative for difficulty urinating, dysuria and hematuria.     Physical Exam   Constitutional: She appears well-developed and well-nourished. No distress. Alert and oriented.  HENT: atraumatic, EOMI bilaterally  Head: Normocephalic.   Neck: Normal range of motion. Neck supple.   Pulmonary: Effort normal.  Abdominal: Soft.   Pelvic exam was performed with patient supine. No labial fusion. There is no rash, tenderness, lesion or injury on the right labia. There is no rash, tenderness, lesion or injury on the left labia. Urethral meatus does not show any tenderness, inflammation or discharge. Palpation of " midline bladder without pain or discomfort. Uterus is not deviated, not enlarged, not fixed and not tender. Cervix exhibits no motion tenderness, no discharge and no friability. Right adnexum displays no mass, no tenderness and no fullness. Left adnexum displays no mass, no tenderness and no fullness. No erythema or tenderness in the vagina. No foreign body in the vagina. No signs of injury around the vagina. Vaginal discharge found. Perineum and anus without areas of injury. No lesions noted or swelling.  Lymphadenopathy:        Right: No inguinal adenopathy present.        Left: No inguinal adenopathy present.

## 2024-10-24 NOTE — PATIENT INSTRUCTIONS
"Patient Education     Urinary tract infections in adults   The Basics   Written by the doctors and editors at [facility]   What is the urinary tract? -- The urinary tract is the group of organs in the body that handle urine (figure 1). The urinary tract includes the:   Kidneys - These are 2 bean-shaped organs that filter the blood to make urine.   Bladder - This is a balloon-shaped organ that stores urine.   Ureters - These are 2 tubes that carry urine from the kidneys to the bladder.   Urethra - This is the tube that carries urine from the bladder to the outside of the body.  What are urinary tract infections? -- Urinary tract infections (\"UTIs\") are infections that affect either the bladder or the kidneys:   Bladder infections are more common than kidney infections. They happen when bacteria get into the urethra and travel up into the bladder. The medical term for bladder infection is \"cystitis.\" Most of the time, when people talk about a UTI, they mean a bladder infection.   Kidney infections happen when the bacteria travel even higher, up into the kidneys. The medical term for kidney infection is \"pyelonephritis.\" This is more serious than a bladder infection, and can lead to other serious problems if it is not treated properly.  Both bladder and kidney infections are more common in females than males.  The risk of UTIs is also higher in people who have a urinary catheter. A catheter is a thin, flexible tube that drains urine from the bladder. It might be used in people who are in the hospital and cannot urinate the normal way.  What are the symptoms of a bladder infection? -- The symptoms include:   Pain or a burning feeling when you urinate   The need to urinate often   The need to urinate suddenly or in a hurry   Blood in the urine  What are the symptoms of a kidney infection? -- The symptoms of a kidney infection can include the same urinary symptoms that happen with a bladder infection.  In addition, kidney " infections can cause:   Fever   Back pain   Nausea or vomiting  How do I find out if I have a UTI? -- If you think that you might have a UTI, call your doctor or nurse. Sometimes, they can tell if you have a UTI just by learning about your symptoms.  Your doctor or nurse might do a simple urine test. If they think that you might have a kidney infection or are unsure what is causing your symptoms, they might also do a more involved urine test to check for bacteria.  How are UTIs treated? -- Most UTIs are treated with antibiotic pills. These pills work by killing the germs that cause the infection.   If you have a bladder infection, you will probably need to take antibiotics for 3 to 7 days.   If you have a kidney infection, you will probably need to take antibiotics for longer, maybe for up to 10 days. If you have a kidney infection, it's also possible that you will need to be treated in the hospital.  Your symptoms should begin to improve within a day of starting antibiotics. But you should finish all of the antibiotic pills. Otherwise, the infection might come back.  If needed, you can also take a medicine to numb your bladder. This medicine eases the pain caused by UTIs. It also reduces the need to urinate.  What if I get bladder infections a lot? -- First, check with your doctor or nurse to make sure that you are really having bladder infections. The symptoms of bladder infection can be caused by other things. Your doctor or nurse will want to see if those problems might be causing your symptoms.  But if you are really having repeated infections, there are things that you can do to keep from getting more infections. These include:   Drinking more fluid - This can help prevent bladder infections.   Vaginal estrogen - If you have already been through menopause, your doctor might suggest this. Vaginal estrogen comes in a cream or a flexible ring that you put into your vagina. It can help prevent bladder  "infections.  Other things that might help:   Avoid spermicides (sperm-killing creams or gels) - Spermicide is a form of birth control. It seems to increase the risk of bladder infections in some females, especially when used with a diaphragm. If you use spermicide and get a lot of bladder infections, you might want to try switching to a different form of birth control.   Urinate right after sex - Some doctors think this helps, because it helps flush out germs that might get into the bladder during sex. There is no proof it works, but it also cannot hurt.  If you get a lot of bladder infections, and the above methods have not helped, your doctor might give you antibiotics to help prevent infection. But long-term use of antibiotics has downsides, so doctors usually suggest trying other things first.  Can cranberry juice or other products prevent bladder infections? -- People often wonder about \"natural\" products that claim to help prevent bladder infections. These include cranberry juice and other cranberry products, probiotics, vitamin C, and D-mannose. There is not good evidence that these things work. However, there is also no clear evidence that they are harmful. If you have questions about these or other products, talk with your doctor or nurse.  All topics are updated as new evidence becomes available and our peer review process is complete.  This topic retrieved from [employer] on: May 09, 2024.  Topic 91010 Version 24.0  Release: 32.4.3 - C32.128  © 2024 UpToDate, Inc. and/or its affiliates. All rights reserved.  figure 1: Anatomy of the urinary tract     Urine is made by the kidneys. It passes from the kidneys into the bladder through 2 tubes called the ureters. Then, it leaves the bladder through another tube called the urethra.  Graphic 26324 Version 8.0  Consumer Information Use and Disclaimer   Disclaimer: This generalized information is a limited summary of diagnosis, treatment, and/or medication " information. It is not meant to be comprehensive and should be used as a tool to help the user understand and/or assess potential diagnostic and treatment options. It does NOT include all information about conditions, treatments, medications, side effects, or risks that may apply to a specific patient. It is not intended to be medical advice or a substitute for the medical advice, diagnosis, or treatment of a health care provider based on the health care provider's examination and assessment of a patient's specific and unique circumstances. Patients must speak with a health care provider for complete information about their health, medical questions, and treatment options, including any risks or benefits regarding use of medications. This information does not endorse any treatments or medications as safe, effective, or approved for treating a specific patient. UpToDate, Inc. and its affiliates disclaim any warranty or liability relating to this information or the use thereof.The use of this information is governed by the Terms of Use, available at https://www.[employer].com/en/know/clinical-effectiveness-terms. 2024© UpToDate, Inc. and its affiliates and/or licensors. All rights reserved.  Copyright   © 2024 UpToDate, Inc. and/or its affiliates. All rights reserved.

## 2025-01-16 ENCOUNTER — OFFICE VISIT (OUTPATIENT)
Age: 80
End: 2025-01-16
Payer: COMMERCIAL

## 2025-01-16 VITALS
HEIGHT: 63 IN | SYSTOLIC BLOOD PRESSURE: 122 MMHG | DIASTOLIC BLOOD PRESSURE: 80 MMHG | BODY MASS INDEX: 28 KG/M2 | WEIGHT: 158 LBS

## 2025-01-16 DIAGNOSIS — N95.8 GENITOURINARY SYNDROME OF MENOPAUSE: ICD-10-CM

## 2025-01-16 DIAGNOSIS — Z46.89 PESSARY MAINTENANCE: ICD-10-CM

## 2025-01-16 DIAGNOSIS — Z96.0 VAGINAL PESSARY IN SITU: Primary | ICD-10-CM

## 2025-01-16 PROCEDURE — 99213 OFFICE O/P EST LOW 20 MIN: CPT | Performed by: NURSE PRACTITIONER

## 2025-01-16 RX ORDER — NAPROXEN 500 MG/1
500 TABLET ORAL 2 TIMES DAILY PRN
COMMUNITY
Start: 2024-10-25

## 2025-01-16 RX ORDER — ESTRADIOL 0.1 MG/G
CREAM VAGINAL
Qty: 42.5 G | Refills: 1 | Status: SHIPPED | OUTPATIENT
Start: 2025-01-16

## 2025-01-16 RX ORDER — MOMETASONE FUROATE 1 MG/ML
SOLUTION TOPICAL
COMMUNITY
Start: 2025-01-14

## 2025-01-16 RX ORDER — KETOCONAZOLE 20 MG/ML
SHAMPOO, SUSPENSION TOPICAL
COMMUNITY
Start: 2025-01-08

## 2025-01-16 NOTE — PROGRESS NOTES
Diagnoses and all orders for this visit:    Vaginal pessary in situ    Pessary maintenance    Genitourinary syndrome of menopause  -     estradiol (ESTRACE) 0.1 mg/g vaginal cream; INSERT 0.5 GRAM VAGINALLY 2 NIGHTS PER WEEK      Call if no symptom improvement, all questions answered, return for annual.          Pleasant 79 y.o. here for pessary maintenance.  She has a pessary for prolapse.  She is no longer interested in surgical options. She states she is doing very well since starting vaginal Estrace and her discharge resolved. She uses it weekly. She denies fever or pelvic pain. She denies any sexual activity anymore although she states her  would like to. Her last annual exam was April 2022 (Medicare). Mammogram done 2024, normal, not dense. Lifetime Tyrer-Cuzick : 5.01%     Past Medical History:   Diagnosis Date    Hypertension      Past Surgical History:   Procedure Laterality Date    APPENDECTOMY      BREAST CYST EXCISION Right 2017    CYST REMOVAL Left 02/2023    arm    DENTAL IMPLANT       Social History     Tobacco Use    Smoking status: Former     Types: Cigarettes    Smokeless tobacco: Never    Tobacco comments:     Quit in 30's   Vaping Use    Vaping status: Never Used   Substance Use Topics    Alcohol use: Yes     Comment: 2x per month    Drug use: Never     Family History   Problem Relation Age of Onset    Breast cancer Maternal Aunt     Colon cancer Neg Hx     Ovarian cancer Neg Hx        Current Outpatient Medications:     estradiol (ESTRACE) 0.1 mg/g vaginal cream, INSERT 0.5 GRAM VAGINALLY 2 NIGHTS PER WEEK, Disp: 42.5 g, Rfl: 1    ketoconazole (NIZORAL) 2 % shampoo, , Disp: , Rfl:     metFORMIN (GLUCOPHAGE) 500 mg tablet, Take 500 mg by mouth 2 (two) times a day with meals, Disp: , Rfl:     metroNIDAZOLE (METROCREAM) 0.75 % cream, APPLY TOPICALLY TO THE AFFECTED AREA OF FACE DAILY, Disp: , Rfl:     mometasone (ELOCON) 0.1 % lotion, , Disp: , Rfl:     naproxen (EC NAPROSYN) 500 MG EC  "tablet, Take 500 mg by mouth 2 (two) times a day as needed, Disp: , Rfl:     pravastatin (PRAVACHOL) 40 mg tablet, TAKE 1 TABLET(40 MG) BY MOUTH EVERY NIGHT, Disp: , Rfl:     lisinopril (ZESTRIL) 20 mg tablet, Take 20 mg by mouth daily at bedtime, Disp: , Rfl:     No Known Allergies  OB History    Para Term  AB Living   1 0 0  1    SAB IAB Ectopic Multiple Live Births             # Outcome Date GA Lbr Tadeo/2nd Weight Sex Type Anes PTL Lv   1 AB                Vitals:    25 1144   BP: 122/80   Weight: 71.7 kg (158 lb)   Height: 5' 3\" (1.6 m)     Body mass index is 27.99 kg/m².  No LMP recorded. Patient is postmenopausal.    Review of Systems   Constitutional: Negative for chills, fatigue, fever and unexpected weight change.   Respiratory: Negative for shortness of breath.    Gastrointestinal: Negative for anal bleeding, blood in stool, constipation and diarrhea.   Genitourinary: Negative for difficulty urinating, dysuria and hematuria.     Physical Exam   Constitutional: She appears well-developed and well-nourished. No distress. Alert and oriented.  HENT: atraumatic  Head: Normocephalic.   Neck: Normal range of motion. Neck supple.   Pulmonary: Effort normal.  Abdominal: Soft.   Pelvic exam was performed with patient supine. No labial fusion. There is no rash, tenderness, lesion or injury on the right labia. There is no rash, tenderness, lesion or injury on the left labia. Urethral meatus does not show any tenderness, inflammation or discharge. Palpation of midline bladder without pain or discomfort. Uterus is not deviated, not enlarged, not fixed and not tender. Cervix exhibits no motion tenderness, no discharge and no friability. Right adnexum displays no mass, no tenderness and no fullness. Left adnexum displays no mass, no tenderness and no fullness. No erythema or tenderness in the vagina. No foreign body in the vagina. No signs of injury around the vagina. Vaginal discharge found. Perineum " and anus without areas of injury. No lesions noted or swelling. Pessary removed and replaced. No lacerations or erosions noted . +cystocele and uterine prolapse. +atrophy  Lymphadenopathy:        Right: No inguinal adenopathy present.        Left: No inguinal adenopathy present.

## 2025-01-16 NOTE — PATIENT INSTRUCTIONS
Patient Education     Pelvic Floor Exercises   About this topic   The pelvic floor consists of muscles and strong bands of tissues which support all of the organs in your pelvis. Some of these organs are the bladder and the small and large bowel as well as the womb and the prostate. If the muscles and tissues get weak, your organs may drop. This can lead to other problems. Your urine may leak when you laugh, sneeze, or cough. You may not be able to drain the bladder fully. You may have less problems if you do exercises to strengthen your pelvic floor and abdominal muscles.  Kegel exercises help make the muscles in the pelvic floor stronger. Anyone can do them. It is also important to make your abdominal muscles stronger. In order for these exercises to work, you must be consistent when doing them.  General   Before starting with a program, ask your doctor if you are healthy enough to do these exercises. Your doctor may have you work with a  or physical therapist to make a safe exercise program to meet your needs.  Strengthening Exercises   Kegel exercises keep your pelvic muscles firm and strong. You can do these in many different positions. Start by lying down with your knees bent and feet on the bed. Squeeze the pelvic muscles as if you are trying to stop the flow of urine. Hold these muscles for a count of 3, and then slowly relax them for a count of 3. Try to work up to squeezing for a count of 10 and slowly relaxing for a count of 10. Increase the muscle squeeze until you get to 10. When relaxing, slowly relax to a count of 10.  Breathe out when you are squeezing and breathe in when you are relaxing. Your goal is to try to do 10 Kegels 3 or more times each day. Take time to rest between sets. Be sure to only contract your pelvic floor muscles, not your buttocks, thighs, or abdominal muscles.  Pelvic floor contractions ? There are a few ways to feel the pelvic muscles contract.  When you are passing  urine, try suddenly stopping your flow of urine. Do not do this on a regular basis, but only to feel what the contraction feels like. Doing this while passing urine can lead to other problems.  Put a finger into the vagina or rectum. Contract the muscles around your finger as if you were trying to stop the flow of urine or stop the passing of gas.  Place two chairs without arms about 2 inches (5 cm) apart. Sit so you have one butt cheek on each chair. Now, try jm your pelvic floor muscles. This will help you keep from using other muscles.  Pelvic tilts ? Lie on your back with your knees bent and feet flat on the floor. Tighten your stomach muscles and press your lower back down to the floor. Try doing Kegels with this exercise when your back is flattened. Hold 3 to 5 seconds. Relax.  Straight leg raises lying down ? Lie on your back with one leg straight. Bend your other knee so the foot is flat on the bed. Keeping your leg straight, lift the leg up to the level of your other knee. Lower it back down. Repeat with the other leg.  Hip lifts ? Lie on your back with your knees bent and feet flat on the floor. Tighten your stomach muscles and lift your buttocks off the floor. Try doing Kegels when up in this position. Hold 3 to 5 seconds. Relax.  Abdominal bracing ? Do this exercise in different positions: Lying down, sitting, and standing. Tighten your stomach muscles. While keeping the stomach muscles tight, tighten your pelvic floor muscles. Now, forcefully laugh or cough to see if you were able to prevent urine from leaking.  Abdominal crunches ? Lie on your back with both knees bent. Keep your feet flat on the floor. Place your hands in one of these positions. Try starting with the first position since it is the easiest. As you get better, use the other positions to make it harder.  Crunches with arms at sides.  Crunches with arms across chest.  Crunches with arms behind head. Be careful not to interlock  your fingers behind your neck or head while doing crunches. This may add tension to your neck and cause strain.  Look at the ceiling. Tighten your belly muscles and lift your shoulders and upper back off the floor. Breathe out while you are doing this. Lower your shoulders to the floor. Breathe in while you are doing this. Relax your belly muscles all the way before starting another crunch.             What will the results be?   Less leakage of urine when you cough, sneeze, laugh, or run  Fewer strong urges to pass urine  Fewer trips to the bathroom each day  Less risk of organs, such as the uterus or bladder, dropping into the vagina  Faster recovery after childbirth or prostate surgery  Stronger core muscles  Increased sensitivity during sex  Helpful tips   You can also try doing a different kind of Kegels. Do 5 quick, strong pelvic floor contractions. Sometimes, if you have an urge to pass urine but are not near a bathroom, you can do this kind of Kegel to calm the urge.  Stay active and work out to keep your muscles strong and flexible.  Keep a healthy weight to avoid putting too much stress on your spine. Eat a healthy diet to keep your muscles healthy.  Be sure you do not hold your breath when exercising. This can raise your blood pressure. If you tend to hold your breath, try counting out loud when exercising. If any exercise bothers you, stop right away.  Try walking or cycling at an easy pace for a few minutes to warm up your muscles. Do this again after exercising.  Doing exercises before a meal may be a good way to get into a routine. A good time to do these exercises is each time you are stopped at a stop light while driving.  Exercise may be slightly uncomfortable, but you should not have sharp pains. If you do get sharp pains, stop what you are doing. If the sharp pains continue, call your doctor.  Last Reviewed Date   2021-03-31  Consumer Information Use and Disclaimer   This generalized information  is a limited summary of diagnosis, treatment, and/or medication information. It is not meant to be comprehensive and should be used as a tool to help the user understand and/or assess potential diagnostic and treatment options. It does NOT include all information about conditions, treatments, medications, side effects, or risks that may apply to a specific patient. It is not intended to be medical advice or a substitute for the medical advice, diagnosis, or treatment of a health care provider based on the health care provider's examination and assessment of a patient’s specific and unique circumstances. Patients must speak with a health care provider for complete information about their health, medical questions, and treatment options, including any risks or benefits regarding use of medications. This information does not endorse any treatments or medications as safe, effective, or approved for treating a specific patient. Up[employer], Inc[employer] and its affiliates disclaim any warranty or liability relating to this information or the use thereof. The use of this information is governed by the Terms of Use, available at https://www.[employer].com/en/know/clinical-effectiveness-terms   Copyright   Copyright © 2024 [employer], [employer] and its affiliates and/or licensors. All rights reserved.